# Patient Record
Sex: FEMALE | Race: WHITE | NOT HISPANIC OR LATINO | Employment: UNEMPLOYED | URBAN - METROPOLITAN AREA
[De-identification: names, ages, dates, MRNs, and addresses within clinical notes are randomized per-mention and may not be internally consistent; named-entity substitution may affect disease eponyms.]

---

## 2020-08-10 ENCOUNTER — HOSPITAL ENCOUNTER (EMERGENCY)
Facility: HOSPITAL | Age: 40
Discharge: HOME | End: 2020-08-10
Attending: EMERGENCY MEDICINE
Payer: OTHER GOVERNMENT

## 2020-08-10 VITALS
RESPIRATION RATE: 18 BRPM | TEMPERATURE: 97 F | HEART RATE: 85 BPM | SYSTOLIC BLOOD PRESSURE: 133 MMHG | OXYGEN SATURATION: 99 % | DIASTOLIC BLOOD PRESSURE: 91 MMHG

## 2020-08-10 DIAGNOSIS — M79.671 PAIN OF RIGHT HEEL: Primary | ICD-10-CM

## 2020-08-10 DIAGNOSIS — M79.673 HEEL PAIN: ICD-10-CM

## 2020-08-10 LAB
B-HCG UR QL: NEGATIVE
CTP QC/QA: YES

## 2020-08-10 PROCEDURE — 99283 EMERGENCY DEPT VISIT LOW MDM: CPT | Mod: 25

## 2020-08-10 PROCEDURE — 81025 URINE PREGNANCY TEST: CPT | Performed by: NURSE PRACTITIONER

## 2020-08-10 NOTE — ED PROVIDER NOTES
Encounter Date: 8/10/2020    SCRIBE #1 NOTE: I, Eva Casas, am scribing for, and in the presence of, Juliette Medley NP.       History     Chief Complaint   Patient presents with    Heel Pain     C/o right heel pain x 8 weeks     Time seen by provider: 5:02 PM on 08/10/2020    Piedad Mcmahon is a 39 y.o. female who presents to the ED with an onset of non-radiating right heel pain for 8 weeks. She states that the pain is worse when she applies pressure to her foot and worse in the late afternoon after she has been on her feet all day. The patient endorses taking Advil with little relief. The patient denies any other symptoms at this time. No pertinent PMHx or PSHx. NKDA.      The history is provided by the patient.     Review of patient's allergies indicates:  No Known Allergies  No past medical history on file.  No past surgical history on file.  No family history on file.  Social History     Tobacco Use    Smoking status: Never Smoker   Substance Use Topics    Alcohol use: Yes    Drug use: Not on file     Review of Systems   Constitutional: Negative for activity change, appetite change, chills and fever.   HENT: Negative for congestion, ear pain, rhinorrhea, sinus pressure, sinus pain, sneezing, sore throat and voice change.    Eyes: Negative for pain, discharge and redness.   Respiratory: Negative for cough, shortness of breath, wheezing and stridor.    Cardiovascular: Negative for chest pain, palpitations and leg swelling.   Gastrointestinal: Negative for abdominal distention, abdominal pain, blood in stool, constipation, diarrhea, nausea and vomiting.   Genitourinary: Negative for difficulty urinating, dysuria, flank pain, frequency, hematuria and urgency.   Musculoskeletal: Positive for myalgias (right heel). Negative for arthralgias, gait problem and joint swelling.   Skin: Negative for rash and wound.   Neurological: Negative for dizziness, syncope, facial asymmetry, speech difficulty, weakness,  light-headedness, numbness and headaches.   Hematological: Negative for adenopathy.   Psychiatric/Behavioral: Negative.        Physical Exam     Initial Vitals [08/10/20 1659]   BP Pulse Resp Temp SpO2   (!) 133/91 85 18 97.2 °F (36.2 °C) 99 %      MAP       --         Physical Exam    Nursing note and vitals reviewed.  Constitutional: Vital signs are normal. She appears well-developed and well-nourished. She is active and cooperative. She is easily aroused.  Non-toxic appearance. No distress.   HENT:   Head: Normocephalic and atraumatic.   Right Ear: Tympanic membrane, external ear and ear canal normal. No drainage or tenderness. No mastoid tenderness. Tympanic membrane is not perforated, not erythematous and not bulging. No middle ear effusion.   Left Ear: Tympanic membrane, external ear and ear canal normal. No drainage or tenderness. No mastoid tenderness. Tympanic membrane is not perforated, not erythematous and not bulging.  No middle ear effusion.   Nose: Nose normal. No rhinorrhea.   Mouth/Throat: Uvula is midline, oropharynx is clear and moist and mucous membranes are normal. No uvula swelling. No oropharyngeal exudate, posterior oropharyngeal edema or posterior oropharyngeal erythema.   Eyes: Conjunctivae, EOM and lids are normal. Pupils are equal, round, and reactive to light. Right eye exhibits no chemosis and no discharge. Left eye exhibits no chemosis and no discharge. Right conjunctiva is not injected. Left conjunctiva is not injected.   Neck: Trachea normal and normal range of motion. Neck supple. No stridor present. No tracheal deviation present. No neck rigidity.   Cardiovascular: Normal rate, regular rhythm, normal heart sounds and normal pulses. Exam reveals no distant heart sounds and no friction rub.    No murmur heard.  Pulmonary/Chest: Breath sounds normal. No stridor. She has no wheezes. She has no rhonchi. She has no rales.   Musculoskeletal: Normal range of motion.      Comments: Right  heel tender to palpation. Pain with flexion. No presence of edema or erythema.   Neurological: She is alert, oriented to person, place, and time and easily aroused. She has normal strength. GCS eye subscore is 4. GCS verbal subscore is 5. GCS motor subscore is 6.   5/5 strength and sensation to light touch to BLE.   Skin: Skin is warm, dry and intact. Capillary refill takes less than 2 seconds. No rash noted.   Psychiatric: She has a normal mood and affect. Her speech is normal and behavior is normal. Thought content normal.         ED Course   Procedures  Labs Reviewed   POCT URINE PREGNANCY          Imaging Results          X-Ray Foot Complete Right (In process)                  Medical Decision Making:   History:   Old Medical Records: I decided to obtain old medical records.  Differential Diagnosis:   Fracture  Heel spur  Plantar fascitis   Clinical Tests:   Lab Tests: Ordered and Reviewed  Radiological Study: Ordered and Reviewed       APC / Resident Notes:   39 year old female presents for evaluation of right heel pain x 2 months. Pain worse at end of day and only hurts with pressure. Xray shows a small heel spur which is likely the cause of pain . Pt instructed to take otc pain relievers and f/u with podiatry. I do not feel further emergent evaluation or treatment is needed and pt is stable for discharge. I have discussed pt with Dr Teresa who agrees with poc. Pt voices understanding and is agreeable to the plan.  She is given specific return precautions.          Scribe Attestation:   Scribe #1: I performed the above scribed service and the documentation accurately describes the services I performed. I attest to the accuracy of the note.    I, THAIS Douglass, personally performed the services described in this documentation. All medical record entries made by the scribe were at my direction and in my presence.  I have reviewed the chart and agree that the record reflects my personal performance and  is accurate and complete. THAIS Douglass.  5:46 PM 08/10/2020          ED Course as of Aug 10 1743   Mon Aug 10, 2020   1727 XR R foot:  No fx or dislocation. (my read)    [MR]      ED Course User Index  [MR] Robin Teresa MD                Clinical Impression:       ICD-10-CM ICD-9-CM   1. Pain of right heel  M79.671 729.5   2. Heel pain  M79.673 729.5         Disposition:   Disposition: Discharged  Condition: Stable                        Juliette Medley NP  08/10/20 1746

## 2020-08-10 NOTE — ED NOTES
Pt reports heel pain for about two months. She states she works out everyday and thinks it may be related, but denies known trauma. Pt has a limp when she walks but able to bear weight. Pt is AAOx4, ABC's intact, NADN.

## 2020-08-10 NOTE — DISCHARGE INSTRUCTIONS
You may take over the counter pain relievers as needed. Follow up with primary care doctor and podiatry as needed. Return to ED for new or worsening symptoms.

## 2021-01-19 ENCOUNTER — OFFICE VISIT (OUTPATIENT)
Dept: FAMILY MEDICINE | Facility: CLINIC | Age: 41
End: 2021-01-19
Payer: OTHER GOVERNMENT

## 2021-01-19 VITALS
SYSTOLIC BLOOD PRESSURE: 120 MMHG | DIASTOLIC BLOOD PRESSURE: 72 MMHG | WEIGHT: 173 LBS | HEIGHT: 63 IN | BODY MASS INDEX: 30.65 KG/M2 | HEART RATE: 80 BPM

## 2021-01-19 DIAGNOSIS — Z12.31 OTHER SCREENING MAMMOGRAM: ICD-10-CM

## 2021-01-19 DIAGNOSIS — Z00.00 GENERAL MEDICAL EXAM: Primary | ICD-10-CM

## 2021-01-19 DIAGNOSIS — Z12.4 CERVICAL CANCER SCREENING: ICD-10-CM

## 2021-01-19 DIAGNOSIS — E78.2 MIXED HYPERLIPIDEMIA: ICD-10-CM

## 2021-01-19 DIAGNOSIS — M77.31 HEEL SPUR, RIGHT: ICD-10-CM

## 2021-01-19 PROCEDURE — 99386 PREV VISIT NEW AGE 40-64: CPT | Mod: S$GLB,,, | Performed by: NURSE PRACTITIONER

## 2021-01-19 PROCEDURE — 99386 PR PREVENTIVE VISIT,NEW,40-64: ICD-10-PCS | Mod: S$GLB,,, | Performed by: NURSE PRACTITIONER

## 2021-01-19 RX ORDER — CHOLECALCIFEROL (VITAMIN D3) 25 MCG
1000 TABLET ORAL DAILY
COMMUNITY

## 2021-01-19 RX ORDER — ROSUVASTATIN CALCIUM 10 MG/1
10 TABLET, COATED ORAL DAILY
COMMUNITY
End: 2021-01-22 | Stop reason: SDUPTHER

## 2021-01-22 LAB
ALBUMIN SERPL-MCNC: 4.2 G/DL (ref 3.6–5.1)
ALBUMIN/GLOB SERPL: 1.5 (CALC) (ref 1–2.5)
ALP SERPL-CCNC: 54 U/L (ref 31–125)
ALT SERPL-CCNC: 19 U/L (ref 6–29)
APPEARANCE UR: ABNORMAL
AST SERPL-CCNC: 14 U/L (ref 10–30)
BACTERIA #/AREA URNS HPF: ABNORMAL /HPF
BACTERIA UR CULT: ABNORMAL
BASOPHILS # BLD AUTO: 59 CELLS/UL (ref 0–200)
BASOPHILS NFR BLD AUTO: 0.9 %
BILIRUB SERPL-MCNC: 0.6 MG/DL (ref 0.2–1.2)
BILIRUB UR QL STRIP: NEGATIVE
BUN SERPL-MCNC: 12 MG/DL (ref 7–25)
BUN/CREAT SERPL: NORMAL (CALC) (ref 6–22)
CALCIUM SERPL-MCNC: 9.5 MG/DL (ref 8.6–10.2)
CHLORIDE SERPL-SCNC: 106 MMOL/L (ref 98–110)
CHOLEST SERPL-MCNC: 208 MG/DL
CHOLEST/HDLC SERPL: 3.4 (CALC)
CO2 SERPL-SCNC: 27 MMOL/L (ref 20–32)
COLOR UR: YELLOW
CREAT SERPL-MCNC: 0.7 MG/DL (ref 0.5–1.1)
EOSINOPHIL # BLD AUTO: 112 CELLS/UL (ref 15–500)
EOSINOPHIL NFR BLD AUTO: 1.7 %
ERYTHROCYTE [DISTWIDTH] IN BLOOD BY AUTOMATED COUNT: 13.4 % (ref 11–15)
GFRSERPLBLD MDRD-ARVRAT: 108 ML/MIN/1.73M2
GLOBULIN SER CALC-MCNC: 2.8 G/DL (CALC) (ref 1.9–3.7)
GLUCOSE SERPL-MCNC: 80 MG/DL (ref 65–99)
GLUCOSE UR QL STRIP: NEGATIVE
HCT VFR BLD AUTO: 45.9 % (ref 35–45)
HDLC SERPL-MCNC: 61 MG/DL
HGB BLD-MCNC: 14.1 G/DL (ref 11.7–15.5)
HGB UR QL STRIP: NEGATIVE
HYALINE CASTS #/AREA URNS LPF: ABNORMAL /LPF
KETONES UR QL STRIP: NEGATIVE
LDLC SERPL CALC-MCNC: 125 MG/DL (CALC)
LEUKOCYTE ESTERASE UR QL STRIP: NEGATIVE
LYMPHOCYTES # BLD AUTO: 2673 CELLS/UL (ref 850–3900)
LYMPHOCYTES NFR BLD AUTO: 40.5 %
MCH RBC QN AUTO: 25.6 PG (ref 27–33)
MCHC RBC AUTO-ENTMCNC: 30.7 G/DL (ref 32–36)
MCV RBC AUTO: 83.3 FL (ref 80–100)
MONOCYTES # BLD AUTO: 528 CELLS/UL (ref 200–950)
MONOCYTES NFR BLD AUTO: 8 %
NEUTROPHILS # BLD AUTO: 3227 CELLS/UL (ref 1500–7800)
NEUTROPHILS NFR BLD AUTO: 48.9 %
NITRITE UR QL STRIP: NEGATIVE
NONHDLC SERPL-MCNC: 147 MG/DL (CALC)
PH UR STRIP: 5.5 [PH] (ref 5–8)
PLATELET # BLD AUTO: 299 THOUSAND/UL (ref 140–400)
PMV BLD REES-ECKER: 11.9 FL (ref 7.5–12.5)
POTASSIUM SERPL-SCNC: 5.1 MMOL/L (ref 3.5–5.3)
PROT SERPL-MCNC: 7 G/DL (ref 6.1–8.1)
PROT UR QL STRIP: NEGATIVE
RBC # BLD AUTO: 5.51 MILLION/UL (ref 3.8–5.1)
RBC #/AREA URNS HPF: ABNORMAL /HPF
SODIUM SERPL-SCNC: 142 MMOL/L (ref 135–146)
SP GR UR STRIP: 1.02 (ref 1–1.03)
SQUAMOUS #/AREA URNS HPF: ABNORMAL /HPF
TRIGL SERPL-MCNC: 108 MG/DL
TSH SERPL-ACNC: 3.35 MIU/L
WBC # BLD AUTO: 6.6 THOUSAND/UL (ref 3.8–10.8)
WBC #/AREA URNS HPF: ABNORMAL /HPF

## 2021-01-22 RX ORDER — ROSUVASTATIN CALCIUM 10 MG/1
10 TABLET, COATED ORAL DAILY
Qty: 90 TABLET | Refills: 1 | Status: SHIPPED | OUTPATIENT
Start: 2021-01-22 | End: 2021-09-06 | Stop reason: SDUPTHER

## 2021-01-29 ENCOUNTER — HOSPITAL ENCOUNTER (OUTPATIENT)
Dept: RADIOLOGY | Facility: HOSPITAL | Age: 41
Discharge: HOME | End: 2021-01-29
Attending: NURSE PRACTITIONER
Payer: OTHER GOVERNMENT

## 2021-01-29 VITALS — HEIGHT: 63 IN | BODY MASS INDEX: 30.66 KG/M2 | WEIGHT: 173.06 LBS

## 2021-01-29 DIAGNOSIS — Z12.31 OTHER SCREENING MAMMOGRAM: ICD-10-CM

## 2021-01-29 PROCEDURE — 77067 SCR MAMMO BI INCL CAD: CPT | Mod: TC,PO

## 2021-05-06 ENCOUNTER — PATIENT MESSAGE (OUTPATIENT)
Dept: RESEARCH | Facility: HOSPITAL | Age: 41
End: 2021-05-06

## 2021-09-07 RX ORDER — ROSUVASTATIN CALCIUM 10 MG/1
10 TABLET, COATED ORAL DAILY
Qty: 90 TABLET | Refills: 1 | Status: SHIPPED | OUTPATIENT
Start: 2021-09-07 | End: 2022-02-01 | Stop reason: SDUPTHER

## 2022-01-11 ENCOUNTER — TELEPHONE (OUTPATIENT)
Dept: FAMILY MEDICINE | Facility: CLINIC | Age: 42
End: 2022-01-11
Payer: OTHER GOVERNMENT

## 2022-01-11 DIAGNOSIS — Z79.899 ENCOUNTER FOR LONG-TERM (CURRENT) USE OF OTHER MEDICATIONS: Primary | ICD-10-CM

## 2022-01-11 DIAGNOSIS — E78.2 MIXED HYPERLIPIDEMIA: ICD-10-CM

## 2022-01-28 LAB
ALBUMIN SERPL-MCNC: 4 G/DL (ref 3.6–5.1)
ALBUMIN/GLOB SERPL: 1.7 (CALC) (ref 1–2.5)
ALP SERPL-CCNC: 50 U/L (ref 31–125)
ALT SERPL-CCNC: 20 U/L (ref 6–29)
APPEARANCE UR: ABNORMAL
AST SERPL-CCNC: 15 U/L (ref 10–30)
BACTERIA #/AREA URNS HPF: ABNORMAL /HPF
BACTERIA UR CULT: ABNORMAL
BASOPHILS # BLD AUTO: 44 CELLS/UL (ref 0–200)
BASOPHILS NFR BLD AUTO: 0.6 %
BILIRUB SERPL-MCNC: 0.5 MG/DL (ref 0.2–1.2)
BILIRUB UR QL STRIP: NEGATIVE
BUN SERPL-MCNC: 10 MG/DL (ref 7–25)
BUN/CREAT SERPL: NORMAL (CALC) (ref 6–22)
CALCIUM SERPL-MCNC: 9.1 MG/DL (ref 8.6–10.2)
CHLORIDE SERPL-SCNC: 106 MMOL/L (ref 98–110)
CHOLEST SERPL-MCNC: 183 MG/DL
CHOLEST/HDLC SERPL: 3 (CALC)
CO2 SERPL-SCNC: 24 MMOL/L (ref 20–32)
COLOR UR: YELLOW
CREAT SERPL-MCNC: 0.7 MG/DL (ref 0.5–1.1)
EOSINOPHIL # BLD AUTO: 131 CELLS/UL (ref 15–500)
EOSINOPHIL NFR BLD AUTO: 1.8 %
ERYTHROCYTE [DISTWIDTH] IN BLOOD BY AUTOMATED COUNT: 13.4 % (ref 11–15)
GLOBULIN SER CALC-MCNC: 2.4 G/DL (CALC) (ref 1.9–3.7)
GLUCOSE SERPL-MCNC: 86 MG/DL (ref 65–99)
GLUCOSE UR QL STRIP: NEGATIVE
HCT VFR BLD AUTO: 40.9 % (ref 35–45)
HDLC SERPL-MCNC: 61 MG/DL
HGB BLD-MCNC: 13.3 G/DL (ref 11.7–15.5)
HGB UR QL STRIP: NEGATIVE
HYALINE CASTS #/AREA URNS LPF: ABNORMAL /LPF
KETONES UR QL STRIP: NEGATIVE
LDLC SERPL CALC-MCNC: 103 MG/DL (CALC)
LEUKOCYTE ESTERASE UR QL STRIP: NEGATIVE
LYMPHOCYTES # BLD AUTO: 2365 CELLS/UL (ref 850–3900)
LYMPHOCYTES NFR BLD AUTO: 32.4 %
MCH RBC QN AUTO: 26.2 PG (ref 27–33)
MCHC RBC AUTO-ENTMCNC: 32.5 G/DL (ref 32–36)
MCV RBC AUTO: 80.7 FL (ref 80–100)
MONOCYTES # BLD AUTO: 584 CELLS/UL (ref 200–950)
MONOCYTES NFR BLD AUTO: 8 %
NEUTROPHILS # BLD AUTO: 4176 CELLS/UL (ref 1500–7800)
NEUTROPHILS NFR BLD AUTO: 57.2 %
NITRITE UR QL STRIP: NEGATIVE
NONHDLC SERPL-MCNC: 122 MG/DL (CALC)
PH UR STRIP: 7.5 [PH] (ref 5–8)
PLATELET # BLD AUTO: 284 THOUSAND/UL (ref 140–400)
PMV BLD REES-ECKER: 11.5 FL (ref 7.5–12.5)
POTASSIUM SERPL-SCNC: 4.3 MMOL/L (ref 3.5–5.3)
PROT SERPL-MCNC: 6.4 G/DL (ref 6.1–8.1)
PROT UR QL STRIP: NEGATIVE
RBC # BLD AUTO: 5.07 MILLION/UL (ref 3.8–5.1)
RBC #/AREA URNS HPF: ABNORMAL /HPF
SERVICE CMNT-IMP: ABNORMAL
SODIUM SERPL-SCNC: 138 MMOL/L (ref 135–146)
SP GR UR STRIP: 1.02 (ref 1–1.03)
SQUAMOUS #/AREA URNS HPF: ABNORMAL /HPF
TRIGL SERPL-MCNC: 92 MG/DL
TSH SERPL-ACNC: 2.94 MIU/L
WBC # BLD AUTO: 7.3 THOUSAND/UL (ref 3.8–10.8)
WBC #/AREA URNS HPF: ABNORMAL /HPF

## 2022-02-01 ENCOUNTER — OFFICE VISIT (OUTPATIENT)
Dept: FAMILY MEDICINE | Facility: CLINIC | Age: 42
End: 2022-02-01
Payer: OTHER GOVERNMENT

## 2022-02-01 VITALS
BODY MASS INDEX: 32.07 KG/M2 | HEART RATE: 72 BPM | SYSTOLIC BLOOD PRESSURE: 110 MMHG | WEIGHT: 181 LBS | DIASTOLIC BLOOD PRESSURE: 72 MMHG | HEIGHT: 63 IN

## 2022-02-01 DIAGNOSIS — Z12.31 SCREENING MAMMOGRAM FOR BREAST CANCER: ICD-10-CM

## 2022-02-01 DIAGNOSIS — Z12.4 CERVICAL CANCER SCREENING: ICD-10-CM

## 2022-02-01 DIAGNOSIS — Z00.00 WELLNESS EXAMINATION: Primary | ICD-10-CM

## 2022-02-01 DIAGNOSIS — E78.2 MIXED HYPERLIPIDEMIA: ICD-10-CM

## 2022-02-01 DIAGNOSIS — N92.0 MENORRHAGIA WITH REGULAR CYCLE: ICD-10-CM

## 2022-02-01 PROCEDURE — 99396 PR PREVENTIVE VISIT,EST,40-64: ICD-10-PCS | Mod: S$GLB,,, | Performed by: NURSE PRACTITIONER

## 2022-02-01 PROCEDURE — 99396 PREV VISIT EST AGE 40-64: CPT | Mod: S$GLB,,, | Performed by: NURSE PRACTITIONER

## 2022-02-01 RX ORDER — ROSUVASTATIN CALCIUM 10 MG/1
10 TABLET, COATED ORAL DAILY
Qty: 90 TABLET | Refills: 1 | Status: SHIPPED | OUTPATIENT
Start: 2022-02-01 | End: 2022-09-20 | Stop reason: SDUPTHER

## 2022-02-01 NOTE — PROGRESS NOTES
SUBJECTIVE:    Patient ID: Piedad Mcmahon is a 41 y.o. female.    Chief Complaint: Follow-up and Annual Exam (No bottles// needs ob referral//refuses flu shot// no complaints-MJ)    Pt presents for routine wellness visit. Denies any new complaints. Has been taking Crestor without issue. Exercises 3 days per week. Denies any joint pains. Has gained about 8lbs since last visit. Did not see gynecology last year. C/o increasingly heavy menses. Now experiencing 7 days of heavy menses for the last 7-8 months. Denies any worsening cramping. Cycles are still regular.   Due for mammogram. Had labs completed prior to visit.       Telephone on 01/11/2022   Component Date Value Ref Range Status    WBC 01/27/2022 7.3  3.8 - 10.8 Thousand/uL Final    RBC 01/27/2022 5.07  3.80 - 5.10 Million/uL Final    Hemoglobin 01/27/2022 13.3  11.7 - 15.5 g/dL Final    Hematocrit 01/27/2022 40.9  35.0 - 45.0 % Final    MCV 01/27/2022 80.7  80.0 - 100.0 fL Final    MCH 01/27/2022 26.2* 27.0 - 33.0 pg Final    MCHC 01/27/2022 32.5  32.0 - 36.0 g/dL Final    RDW 01/27/2022 13.4  11.0 - 15.0 % Final    Platelets 01/27/2022 284  140 - 400 Thousand/uL Final    MPV 01/27/2022 11.5  7.5 - 12.5 fL Final    Neutrophils, Abs 01/27/2022 4,176  1,500 - 7,800 cells/uL Final    Lymph # 01/27/2022 2,365  850 - 3,900 cells/uL Final    Mono # 01/27/2022 584  200 - 950 cells/uL Final    Eos # 01/27/2022 131  15 - 500 cells/uL Final    Baso # 01/27/2022 44  0 - 200 cells/uL Final    Neutrophils Relative 01/27/2022 57.2  % Final    Lymph % 01/27/2022 32.4  % Final    Mono % 01/27/2022 8.0  % Final    Eosinophil % 01/27/2022 1.8  % Final    Basophil % 01/27/2022 0.6  % Final    Glucose 01/27/2022 86  65 - 99 mg/dL Final    BUN 01/27/2022 10  7 - 25 mg/dL Final    Creatinine 01/27/2022 0.70  0.50 - 1.10 mg/dL Final    eGFR if non African American 01/27/2022 107  > OR = 60 mL/min/1.73m2 Final    eGFR if  01/27/2022 124  >  OR = 60 mL/min/1.73m2 Final    BUN/Creatinine Ratio 01/27/2022 NOT APPLICABLE  6 - 22 (calc) Final    Sodium 01/27/2022 138  135 - 146 mmol/L Final    Potassium 01/27/2022 4.3  3.5 - 5.3 mmol/L Final    Chloride 01/27/2022 106  98 - 110 mmol/L Final    CO2 01/27/2022 24  20 - 32 mmol/L Final    Calcium 01/27/2022 9.1  8.6 - 10.2 mg/dL Final    Total Protein 01/27/2022 6.4  6.1 - 8.1 g/dL Final    Albumin 01/27/2022 4.0  3.6 - 5.1 g/dL Final    Globulin, Total 01/27/2022 2.4  1.9 - 3.7 g/dL (calc) Final    Albumin/Globulin Ratio 01/27/2022 1.7  1.0 - 2.5 (calc) Final    Total Bilirubin 01/27/2022 0.5  0.2 - 1.2 mg/dL Final    Alkaline Phosphatase 01/27/2022 50  31 - 125 U/L Final    AST 01/27/2022 15  10 - 30 U/L Final    ALT 01/27/2022 20  6 - 29 U/L Final    Cholesterol 01/27/2022 183  <200 mg/dL Final    HDL 01/27/2022 61  > OR = 50 mg/dL Final    Triglycerides 01/27/2022 92  <150 mg/dL Final    LDL Cholesterol 01/27/2022 103* mg/dL (calc) Final    HDL/Cholesterol Ratio 01/27/2022 3.0  <5.0 (calc) Final    Non HDL Chol. (LDL+VLDL) 01/27/2022 122  <130 mg/dL (calc) Final    TSH w/reflex to FT4 01/27/2022 2.94  mIU/L Final    Color, UA 01/27/2022 YELLOW  YELLOW Final    Appearance, UA 01/27/2022 CLOUDY* CLEAR Final    Specific [place], UA 01/27/2022 1.019  1.001 - 1.035 Final    pH, UA 01/27/2022 7.5  5.0 - 8.0 Final    Glucose, UA 01/27/2022 NEGATIVE  NEGATIVE Final    Bilirubin, UA 01/27/2022 NEGATIVE  NEGATIVE Final    Ketones, UA 01/27/2022 NEGATIVE  NEGATIVE Final    Occult Blood UA 01/27/2022 NEGATIVE  NEGATIVE Final    Protein, UA 01/27/2022 NEGATIVE  NEGATIVE Final    Nitrite, UA 01/27/2022 NEGATIVE  NEGATIVE Final    Leukocytes, UA 01/27/2022 NEGATIVE  NEGATIVE Final    WBC Casts, UA 01/27/2022 0-5  < OR = 5 /HPF Final    RBC Casts, UA 01/27/2022 0-2  < OR = 2 /HPF Final    Squam Epithel, UA 01/27/2022 6-10* < OR = 5 /HPF Final    Bacteria, UA 01/27/2022 FEW* NONE SEEN  "/HPF Final    Hyaline Casts, UA 2022 0-1* NONE SEEN /LPF Final    Comments: 2022 FEW MUCOUS THREADS   Final    Reflexive Urine Culture 2022    Final       Past Medical History:   Diagnosis Date    Hyperlipidemia      Past Surgical History:   Procedure Laterality Date     SECTION      x3     Family History   Problem Relation Age of Onset    Heart disease Father     Diabetes Maternal Grandfather     Heart disease Paternal Grandmother     Diabetes Paternal Grandfather        Marital Status:   Alcohol History:  reports current alcohol use.  Tobacco History:  reports that she has never smoked. She has never used smokeless tobacco.  Drug History:  has no history on file for drug use.    Review of patient's allergies indicates:  No Known Allergies    Current Outpatient Medications:     azithromycin (Z-SOFIE) 250 MG tablet, Take 1 tablet (250 mg total) by mouth once daily. Take first 2 tablets together, then 1 every day until finished., Disp: 6 tablet, Rfl: 0    rosuvastatin (CRESTOR) 10 MG tablet, Take 1 tablet (10 mg total) by mouth once daily., Disp: 90 tablet, Rfl: 1    vitamin D (VITAMIN D3) 1000 units Tab, Take 1,000 Units by mouth once daily., Disp: , Rfl:     Review of Systems   Constitutional: Negative for activity change, fatigue and unexpected weight change.   HENT: Negative.    Respiratory: Negative for cough, chest tightness and shortness of breath.    Cardiovascular: Negative for chest pain and palpitations.   Gastrointestinal: Negative for abdominal distention and constipation.   Genitourinary: Positive for menstrual problem (heavy periods).   Musculoskeletal: Negative for arthralgias and back pain.   Neurological: Negative for dizziness and headaches.   Psychiatric/Behavioral: Negative.           Objective:      Vitals:    22 0753   BP: 110/72   Pulse: 72   Weight: 82.1 kg (181 lb)   Height: 5' 3" (1.6 m)     Body mass index is 32.06 kg/m².  Physical " Exam  Constitutional:       Appearance: Normal appearance.   HENT:      Head: Normocephalic and atraumatic.      Right Ear: Tympanic membrane normal.      Left Ear: Tympanic membrane normal.   Cardiovascular:      Rate and Rhythm: Normal rate and regular rhythm.      Heart sounds: Normal heart sounds.   Pulmonary:      Effort: Pulmonary effort is normal. No respiratory distress.      Breath sounds: Normal breath sounds.   Abdominal:      General: There is no distension.      Tenderness: There is no abdominal tenderness.   Musculoskeletal:         General: Normal range of motion.      Cervical back: Normal range of motion. No tenderness.   Skin:     General: Skin is warm.   Neurological:      Mental Status: She is alert and oriented to person, place, and time.   Psychiatric:         Mood and Affect: Mood normal.           Assessment:       1. Wellness examination    2. Cervical cancer screening    3. Mixed hyperlipidemia    4. Screening mammogram for breast cancer    5. Menorrhagia with regular cycle    6. BMI 32.0-32.9,adult         Plan:       Wellness examination    Cervical cancer screening  -     Ambulatory referral/consult to Obstetrics / Gynecology; Future; Expected date: 02/01/2022    Mixed hyperlipidemia  -     rosuvastatin (CRESTOR) 10 MG tablet; Take 1 tablet (10 mg total) by mouth once daily.  Dispense: 90 tablet; Refill: 1    Screening mammogram for breast cancer  -     Mammo Digital Screening Bilat w/ [facility]; Future; Expected date: 02/01/2022    Menorrhagia with regular cycle  -     Ambulatory referral/consult to Obstetrics / Gynecology; Future; Expected date: 02/01/2022    BMI 32.0-32.9,adult    Pt doing well at this time. Encouraged dietary modification. New referral placed to Gynecology.    Follow up in about 1 year (around 2/1/2023) for Annual Physical.

## 2022-02-17 ENCOUNTER — HOSPITAL ENCOUNTER (OUTPATIENT)
Dept: RADIOLOGY | Facility: HOSPITAL | Age: 42
Discharge: HOME | End: 2022-02-17
Attending: NURSE PRACTITIONER
Payer: OTHER GOVERNMENT

## 2022-02-17 VITALS — BODY MASS INDEX: 32.07 KG/M2 | HEIGHT: 63 IN | WEIGHT: 181 LBS

## 2022-02-17 DIAGNOSIS — Z12.31 SCREENING MAMMOGRAM FOR BREAST CANCER: ICD-10-CM

## 2022-02-17 PROCEDURE — 77067 SCR MAMMO BI INCL CAD: CPT | Mod: TC,PO

## 2022-03-21 ENCOUNTER — PATIENT MESSAGE (OUTPATIENT)
Dept: FAMILY MEDICINE | Facility: CLINIC | Age: 42
End: 2022-03-21

## 2022-03-21 ENCOUNTER — PATIENT MESSAGE (OUTPATIENT)
Dept: FAMILY MEDICINE | Facility: CLINIC | Age: 42
End: 2022-03-21
Payer: OTHER GOVERNMENT

## 2022-03-21 ENCOUNTER — OFFICE VISIT (OUTPATIENT)
Dept: FAMILY MEDICINE | Facility: CLINIC | Age: 42
End: 2022-03-21
Payer: OTHER GOVERNMENT

## 2022-03-21 VITALS
WEIGHT: 177 LBS | HEIGHT: 63 IN | HEART RATE: 70 BPM | BODY MASS INDEX: 31.36 KG/M2 | SYSTOLIC BLOOD PRESSURE: 110 MMHG | OXYGEN SATURATION: 96 % | DIASTOLIC BLOOD PRESSURE: 72 MMHG

## 2022-03-21 DIAGNOSIS — M25.561 CHRONIC PAIN OF RIGHT KNEE: Primary | ICD-10-CM

## 2022-03-21 DIAGNOSIS — G89.29 CHRONIC PAIN OF RIGHT KNEE: Primary | ICD-10-CM

## 2022-03-21 PROCEDURE — 99213 PR OFFICE/OUTPT VISIT, EST, LEVL III, 20-29 MIN: ICD-10-PCS | Mod: S$GLB,,, | Performed by: NURSE PRACTITIONER

## 2022-03-21 PROCEDURE — 99213 OFFICE O/P EST LOW 20 MIN: CPT | Mod: S$GLB,,, | Performed by: NURSE PRACTITIONER

## 2022-03-21 NOTE — PROGRESS NOTES
SUBJECTIVE:    Patient ID: Piedad Mcmahon is a 41 y.o. female.    Chief Complaint: Knee Pain (Right knee pain when kneeling only for 7 months//no med bottles//tc)    Pt presents with c/o right knee pain along the patella. Only occurs when she kneels (like at [employer] or exercising). Has been ongoing for a few months now. No pain with palpation. No other activities cause the pain. Can run, jump, and walk without issue. Denies any recent injuries to the knee. Has not really tried anything to alleviate the pain. Only occurs when she is kneeling and resolves when she removes weight/pressure from the knee (with standing or sitting).         Telephone on 01/11/2022   Component Date Value Ref Range Status    WBC 01/27/2022 7.3  3.8 - 10.8 Thousand/uL Final    RBC 01/27/2022 5.07  3.80 - 5.10 Million/uL Final    Hemoglobin 01/27/2022 13.3  11.7 - 15.5 g/dL Final    Hematocrit 01/27/2022 40.9  35.0 - 45.0 % Final    MCV 01/27/2022 80.7  80.0 - 100.0 fL Final    MCH 01/27/2022 26.2 (A) 27.0 - 33.0 pg Final    MCHC 01/27/2022 32.5  32.0 - 36.0 g/dL Final    RDW 01/27/2022 13.4  11.0 - 15.0 % Final    Platelets 01/27/2022 284  140 - 400 Thousand/uL Final    MPV 01/27/2022 11.5  7.5 - 12.5 fL Final    Neutrophils, Abs 01/27/2022 4,176  1,500 - 7,800 cells/uL Final    Lymph # 01/27/2022 2,365  850 - 3,900 cells/uL Final    Mono # 01/27/2022 584  200 - 950 cells/uL Final    Eos # 01/27/2022 131  15 - 500 cells/uL Final    Baso # 01/27/2022 44  0 - 200 cells/uL Final    Neutrophils Relative 01/27/2022 57.2  % Final    Lymph % 01/27/2022 32.4  % Final    Mono % 01/27/2022 8.0  % Final    Eosinophil % 01/27/2022 1.8  % Final    Basophil % 01/27/2022 0.6  % Final    Glucose 01/27/2022 86  65 - 99 mg/dL Final    BUN 01/27/2022 10  7 - 25 mg/dL Final    Creatinine 01/27/2022 0.70  0.50 - 1.10 mg/dL Final    eGFR if non African American 01/27/2022 107  > OR = 60 mL/min/1.73m2 Final    eGFR if   01/27/2022 124  > OR = 60 mL/min/1.73m2 Final    BUN/Creatinine Ratio 01/27/2022 NOT APPLICABLE  6 - 22 (calc) Final    Sodium 01/27/2022 138  135 - 146 mmol/L Final    Potassium 01/27/2022 4.3  3.5 - 5.3 mmol/L Final    Chloride 01/27/2022 106  98 - 110 mmol/L Final    CO2 01/27/2022 24  20 - 32 mmol/L Final    Calcium 01/27/2022 9.1  8.6 - 10.2 mg/dL Final    Total Protein 01/27/2022 6.4  6.1 - 8.1 g/dL Final    Albumin 01/27/2022 4.0  3.6 - 5.1 g/dL Final    Globulin, Total 01/27/2022 2.4  1.9 - 3.7 g/dL (calc) Final    Albumin/Globulin Ratio 01/27/2022 1.7  1.0 - 2.5 (calc) Final    Total Bilirubin 01/27/2022 0.5  0.2 - 1.2 mg/dL Final    Alkaline Phosphatase 01/27/2022 50  31 - 125 U/L Final    AST 01/27/2022 15  10 - 30 U/L Final    ALT 01/27/2022 20  6 - 29 U/L Final    Cholesterol 01/27/2022 183  <200 mg/dL Final    HDL 01/27/2022 61  > OR = 50 mg/dL Final    Triglycerides 01/27/2022 92  <150 mg/dL Final    LDL Cholesterol 01/27/2022 103 (A) mg/dL (calc) Final    HDL/Cholesterol Ratio 01/27/2022 3.0  <5.0 (calc) Final    Non HDL Chol. (LDL+VLDL) 01/27/2022 122  <130 mg/dL (calc) Final    TSH w/reflex to FT4 01/27/2022 2.94  mIU/L Final    Color, UA 01/27/2022 YELLOW  YELLOW Final    Appearance, UA 01/27/2022 CLOUDY (A) CLEAR Final    Specific [place], UA 01/27/2022 1.019  1.001 - 1.035 Final    pH, UA 01/27/2022 7.5  5.0 - 8.0 Final    Glucose, UA 01/27/2022 NEGATIVE  NEGATIVE Final    Bilirubin, UA 01/27/2022 NEGATIVE  NEGATIVE Final    Ketones, UA 01/27/2022 NEGATIVE  NEGATIVE Final    Occult Blood UA 01/27/2022 NEGATIVE  NEGATIVE Final    Protein, UA 01/27/2022 NEGATIVE  NEGATIVE Final    Nitrite, UA 01/27/2022 NEGATIVE  NEGATIVE Final    Leukocytes, UA 01/27/2022 NEGATIVE  NEGATIVE Final    WBC Casts, UA 01/27/2022 0-5  < OR = 5 /HPF Final    RBC Casts, UA 01/27/2022 0-2  < OR = 2 /HPF Final    Squam Epithel, UA 01/27/2022 6-10 (A) < OR = 5 /HPF Final    Bacteria, UA  "2022 FEW (A) NONE SEEN /HPF Final    Hyaline Casts, UA 2022 0-1 (A) NONE SEEN /LPF Final    Comments: 2022 FEW MUCOUS THREADS   Final    Reflexive Urine Culture 2022    Final       Past Medical History:   Diagnosis Date    Hyperlipidemia      Past Surgical History:   Procedure Laterality Date     SECTION      x3     Family History   Problem Relation Age of Onset    Heart disease Father     Diabetes Maternal Grandfather     Heart disease Paternal Grandmother     Diabetes Paternal Grandfather        Marital Status:   Alcohol History:  reports current alcohol use.  Tobacco History:  reports that she has never smoked. She has never used smokeless tobacco.  Drug History:  has no history on file for drug use.    Review of patient's allergies indicates:  No Known Allergies    Current Outpatient Medications:     azithromycin (Z-SOFIE) 250 MG tablet, Take 1 tablet (250 mg total) by mouth once daily. Take first 2 tablets together, then 1 every day until finished. (Patient not taking: Reported on 3/21/2022), Disp: 6 tablet, Rfl: 0    rosuvastatin (CRESTOR) 10 MG tablet, Take 1 tablet (10 mg total) by mouth once daily., Disp: 90 tablet, Rfl: 1    vitamin D (VITAMIN D3) 1000 units Tab, Take 1,000 Units by mouth once daily., Disp: , Rfl:     Review of Systems   Constitutional: Negative for activity change and fatigue.   HENT: Negative.    Respiratory: Negative.    Cardiovascular: Negative.    Musculoskeletal: Positive for arthralgias (right patellar knee pain with kneeling).   Neurological: Negative for dizziness and headaches.          Objective:      Vitals:    22 1422   BP: 110/72   Pulse: 70   SpO2: 96%   Weight: 80.3 kg (177 lb)   Height: 5' 3" (1.6 m)     Body mass index is 31.35 kg/m².  Physical Exam  Constitutional:       Appearance: Normal appearance.   HENT:      Head: Normocephalic and atraumatic.   Cardiovascular:      Rate and Rhythm: Normal rate.   Pulmonary:    "   Effort: Pulmonary effort is normal. No respiratory distress.   Musculoskeletal:      Right knee: No swelling or bony tenderness. Normal range of motion. Normal patellar mobility.   Skin:     General: Skin is warm.   Neurological:      Mental Status: She is alert and oriented to person, place, and time.   Psychiatric:         Mood and Affect: Mood normal.           Assessment:       1. Chronic pain of right knee         Plan:       Chronic pain of right knee  -     Ambulatory referral/consult to Orthopedics; Future; Expected date: 03/21/2022    Unsure of source of the pain. Will refer to Ortho for workup per pt's request.    Follow up if symptoms worsen or fail to improve.

## 2022-03-22 ENCOUNTER — TELEPHONE (OUTPATIENT)
Dept: FAMILY MEDICINE | Facility: CLINIC | Age: 42
End: 2022-03-22
Payer: OTHER GOVERNMENT

## 2022-03-23 ENCOUNTER — TELEPHONE (OUTPATIENT)
Dept: FAMILY MEDICINE | Facility: CLINIC | Age: 42
End: 2022-03-23
Payer: OTHER GOVERNMENT

## 2022-04-04 DIAGNOSIS — M25.561 RIGHT KNEE PAIN, UNSPECIFIED CHRONICITY: Primary | ICD-10-CM

## 2022-04-11 ENCOUNTER — HOSPITAL ENCOUNTER (OUTPATIENT)
Dept: RADIOLOGY | Facility: HOSPITAL | Age: 42
Discharge: HOME | End: 2022-04-11
Attending: ORTHOPAEDIC SURGERY
Payer: OTHER GOVERNMENT

## 2022-04-11 ENCOUNTER — OFFICE VISIT (OUTPATIENT)
Dept: ORTHOPEDICS | Facility: CLINIC | Age: 42
End: 2022-04-11
Payer: OTHER GOVERNMENT

## 2022-04-11 VITALS — HEIGHT: 63 IN | WEIGHT: 177 LBS | BODY MASS INDEX: 31.36 KG/M2 | RESPIRATION RATE: 18 BRPM

## 2022-04-11 DIAGNOSIS — M25.561 CHRONIC PAIN OF RIGHT KNEE: ICD-10-CM

## 2022-04-11 DIAGNOSIS — M22.41 CHONDROMALACIA, PATELLA, RIGHT: Primary | ICD-10-CM

## 2022-04-11 DIAGNOSIS — M25.561 RIGHT KNEE PAIN, UNSPECIFIED CHRONICITY: ICD-10-CM

## 2022-04-11 DIAGNOSIS — G89.29 CHRONIC PAIN OF RIGHT KNEE: ICD-10-CM

## 2022-04-11 PROCEDURE — 99243 OFF/OP CNSLTJ NEW/EST LOW 30: CPT | Mod: S$PBB,,, | Performed by: ORTHOPAEDIC SURGERY

## 2022-04-11 PROCEDURE — 73562 X-RAY EXAM OF KNEE 3: CPT | Mod: 26,LT,, | Performed by: RADIOLOGY

## 2022-04-11 PROCEDURE — 73562 X-RAY EXAM OF KNEE 3: CPT | Mod: TC,PN,LT

## 2022-04-11 PROCEDURE — 99999 PR PBB SHADOW E&M-EST. PATIENT-LVL III: CPT | Mod: PBBFAC,,, | Performed by: ORTHOPAEDIC SURGERY

## 2022-04-11 PROCEDURE — 73564 XR KNEE ORTHO RIGHT WITH FLEXION: ICD-10-PCS | Mod: 26,RT,, | Performed by: RADIOLOGY

## 2022-04-11 PROCEDURE — 99999 PR PBB SHADOW E&M-EST. PATIENT-LVL III: ICD-10-PCS | Mod: PBBFAC,,, | Performed by: ORTHOPAEDIC SURGERY

## 2022-04-11 PROCEDURE — 73564 X-RAY EXAM KNEE 4 OR MORE: CPT | Mod: 26,RT,, | Performed by: RADIOLOGY

## 2022-04-11 PROCEDURE — 99243 PR OFFICE CONSULTATION,LEVEL III: ICD-10-PCS | Mod: S$PBB,,, | Performed by: ORTHOPAEDIC SURGERY

## 2022-04-11 PROCEDURE — 73562 XR KNEE ORTHO RIGHT WITH FLEXION: ICD-10-PCS | Mod: 26,LT,, | Performed by: RADIOLOGY

## 2022-04-11 PROCEDURE — 99213 OFFICE O/P EST LOW 20 MIN: CPT | Mod: PBBFAC,PN | Performed by: ORTHOPAEDIC SURGERY

## 2022-04-11 NOTE — PROGRESS NOTES
Past Medical History:   Diagnosis Date    Hyperlipidemia        Past Surgical History:   Procedure Laterality Date     SECTION      x3       Current Outpatient Medications   Medication Sig    rosuvastatin (CRESTOR) 10 MG tablet Take 1 tablet (10 mg total) by mouth once daily.    vitamin D (VITAMIN D3) 1000 units Tab Take 1,000 Units by mouth once daily.    azithromycin (Z-SOFIE) 250 MG tablet Take 1 tablet (250 mg total) by mouth once daily. Take first 2 tablets together, then 1 every day until finished. (Patient not taking: Reported on 3/21/2022)     No current facility-administered medications for this visit.       Review of patient's allergies indicates:  No Known Allergies    Family History   Problem Relation Age of Onset    Heart disease Father     Diabetes Maternal Grandfather     Heart disease Paternal Grandmother     Diabetes Paternal Grandfather        Social History     Socioeconomic History    Marital status:    Tobacco Use    Smoking status: Never Smoker    Smokeless tobacco: Never Used   Substance and Sexual Activity    Alcohol use: Yes    Sexual activity: Yes     Birth control/protection: Coitus interruptus       Chief Complaint:   Chief Complaint   Patient presents with    Right Knee - Pain       History of present illness:  This is a 41-year-old female seen in consultation for POLINA Posadas  for right knee pain.  Started back in September or  so.  No specific injury.  Noticed it after doing a lot of Grout on her sandy.  Pain is right over the anterolateral knee.  No swelling.  No mechanical symptoms.  Got worse about 2 weeks ago.  Cannot kneel.  Pain is size 7/10.      Answers for HPI/ROS submitted by the patient on 2022  unexpected weight change: No  appetite change : No  sleep disturbance: No  IMMUNOCOMPROMISED: No  nervous/ anxious: No  dysphoric mood: No  rash: No  visual disturbance: No  eye redness: No  eye pain: No  ear pain: No  tinnitus: No  hearing loss:  No  sinus pressure : No  nosebleeds: No  enviro allergies: No  food allergies: No  cough: No  shortness of breath: No  sweating: No  dysuria: No  frequency: No  difficulty urinating: No  hematuria: No  painful intercourse: No  chest pain: No  palpitations: No  nausea: No  vomiting: No  diarrhea: No  blood in stool: No  constipation: No  headaches: No  dizziness: No  numbness: No  seizures: No  joint swelling: No  myalgia: No  weakness: No  back pain: No  Pain Chronicity: recurrent  History of trauma: No  Onset: more than 1 month ago  Frequency: intermittently  Progression since onset: unchanged  injury location: exercising  pain- numeric: 4/10  pain location: right knee  pain quality: sharp  Radiating Pain: No  Aggravating factors: contact, touching  fever: No  inability to bear weight: No  itching: No  joint locking: No  limited range of motion: No  stiffness: No  tingling: No  Treatments tried: nothing  physical therapy: not tried  Improvement on treatment: no relief      Physical Examination:    Vital Signs:    Vitals:    04/11/22 1507   Resp: 18       Body mass index is 31.35 kg/m².    This a well-developed, well nourished patient in no acute distress.  They are alert and oriented and cooperative to examination.  Pt. walks without an antalgic gait.      Examination of the right knee shows no rashes or erythema. There are no masses ecchymosis or effusion. Patient has full range of motion from 0-130°. Patient is nontender to palpation over lateral joint line and nontender to palpation over the medial joint line. Patient has a - Lachman exam, - anterior drawer exam, and - posterior drawer exam. - Yolande's exam. Knee is stable to varus and valgus stress. 5 out of 5 motor strength. Palpable distal pulses. Intact light touch sensation. Negative Patellofemoral crepitus      X-rays:  X-rays of the right knee are ordered and review which show some lateral patellar tilt.     Assessment::  Right patellofemoral  pain    Plan:  I reviewed the findings with her today.  I think it is likely she has some chondromalacia patella.  I gave her some information about this.  Gave her a patellofemoral exercise guide.  Also encouraged her to try a knee sleeve.  Follow-up if not improving.    This note was created using Interview voice recognition software that occasionally misinterpreted phrases or words.    Consult note is delivered via Epic messaging service.

## 2022-09-20 DIAGNOSIS — E78.2 MIXED HYPERLIPIDEMIA: ICD-10-CM

## 2022-09-20 RX ORDER — ROSUVASTATIN CALCIUM 10 MG/1
10 TABLET, COATED ORAL DAILY
Qty: 90 TABLET | Refills: 1 | Status: SHIPPED | OUTPATIENT
Start: 2022-09-20 | End: 2023-05-09 | Stop reason: SDUPTHER

## 2022-09-20 NOTE — TELEPHONE ENCOUNTER
----- Message from Nilam Cerrato sent at 9/20/2022  8:29 AM CDT -----  Patient called and stated that she need a refill of her rosuvastatin called into [facility] on  if any questions please give her a call at [phone]

## 2023-02-23 DIAGNOSIS — Z12.31 ENCOUNTER FOR SCREENING MAMMOGRAM FOR MALIGNANT NEOPLASM OF BREAST: Primary | ICD-10-CM

## 2023-03-06 ENCOUNTER — HOSPITAL ENCOUNTER (OUTPATIENT)
Dept: RADIOLOGY | Facility: HOSPITAL | Age: 43
Discharge: HOME | End: 2023-03-06
Attending: NURSE PRACTITIONER
Payer: OTHER GOVERNMENT

## 2023-03-06 VITALS — WEIGHT: 177 LBS | HEIGHT: 63 IN | BODY MASS INDEX: 31.36 KG/M2

## 2023-03-06 DIAGNOSIS — Z12.31 ENCOUNTER FOR SCREENING MAMMOGRAM FOR MALIGNANT NEOPLASM OF BREAST: ICD-10-CM

## 2023-03-06 PROCEDURE — 77067 SCR MAMMO BI INCL CAD: CPT | Mod: TC,PO

## 2023-03-22 ENCOUNTER — TELEPHONE (OUTPATIENT)
Dept: FAMILY MEDICINE | Facility: CLINIC | Age: 43
End: 2023-03-22

## 2023-03-22 DIAGNOSIS — Z79.899 ENCOUNTER FOR LONG-TERM (CURRENT) USE OF OTHER MEDICATIONS: ICD-10-CM

## 2023-03-22 DIAGNOSIS — N92.0 MENORRHAGIA WITH REGULAR CYCLE: ICD-10-CM

## 2023-03-22 DIAGNOSIS — E78.2 MIXED HYPERLIPIDEMIA: ICD-10-CM

## 2023-03-22 DIAGNOSIS — Z00.00 WELLNESS EXAMINATION: Primary | ICD-10-CM

## 2023-03-22 NOTE — TELEPHONE ENCOUNTER
Spoke to pt , Hayder, to remind him lab is due. He also inquired about Ms Piedad hernandez. Added her yearly orders. Said they are coming next week.

## 2023-05-04 LAB
ALBUMIN SERPL-MCNC: 4.3 G/DL (ref 3.6–5.1)
ALBUMIN/GLOB SERPL: 1.6 (CALC) (ref 1–2.5)
ALP SERPL-CCNC: 54 U/L (ref 31–125)
ALT SERPL-CCNC: 15 U/L (ref 6–29)
APPEARANCE UR: CLEAR
AST SERPL-CCNC: 13 U/L (ref 10–30)
BACTERIA #/AREA URNS HPF: ABNORMAL /HPF
BACTERIA UR CULT: ABNORMAL
BASOPHILS # BLD AUTO: 52 CELLS/UL (ref 0–200)
BASOPHILS NFR BLD AUTO: 0.8 %
BILIRUB SERPL-MCNC: 0.8 MG/DL (ref 0.2–1.2)
BILIRUB UR QL STRIP: NEGATIVE
BUN SERPL-MCNC: 10 MG/DL (ref 7–25)
BUN/CREAT SERPL: NORMAL (CALC) (ref 6–22)
CALCIUM SERPL-MCNC: 9.4 MG/DL (ref 8.6–10.2)
CHLORIDE SERPL-SCNC: 105 MMOL/L (ref 98–110)
CHOLEST SERPL-MCNC: 215 MG/DL
CHOLEST/HDLC SERPL: 3 (CALC)
CO2 SERPL-SCNC: 28 MMOL/L (ref 20–32)
COLOR UR: ABNORMAL
CREAT SERPL-MCNC: 0.76 MG/DL (ref 0.5–0.99)
EGFR: 100 ML/MIN/1.73M2
EOSINOPHIL # BLD AUTO: 72 CELLS/UL (ref 15–500)
EOSINOPHIL NFR BLD AUTO: 1.1 %
ERYTHROCYTE [DISTWIDTH] IN BLOOD BY AUTOMATED COUNT: 13.3 % (ref 11–15)
GLOBULIN SER CALC-MCNC: 2.7 G/DL (CALC) (ref 1.9–3.7)
GLUCOSE SERPL-MCNC: 91 MG/DL (ref 65–99)
GLUCOSE UR QL STRIP: NEGATIVE
HCT VFR BLD AUTO: 43.5 % (ref 35–45)
HDLC SERPL-MCNC: 71 MG/DL
HGB BLD-MCNC: 14.4 G/DL (ref 11.7–15.5)
HGB UR QL STRIP: NEGATIVE
HYALINE CASTS #/AREA URNS LPF: ABNORMAL /LPF
KETONES UR QL STRIP: NEGATIVE
LDLC SERPL CALC-MCNC: 124 MG/DL (CALC)
LEUKOCYTE ESTERASE UR QL STRIP: NEGATIVE
LYMPHOCYTES # BLD AUTO: 1983 CELLS/UL (ref 850–3900)
LYMPHOCYTES NFR BLD AUTO: 30.5 %
MCH RBC QN AUTO: 27.8 PG (ref 27–33)
MCHC RBC AUTO-ENTMCNC: 33.1 G/DL (ref 32–36)
MCV RBC AUTO: 84 FL (ref 80–100)
MONOCYTES # BLD AUTO: 553 CELLS/UL (ref 200–950)
MONOCYTES NFR BLD AUTO: 8.5 %
NEUTROPHILS # BLD AUTO: 3842 CELLS/UL (ref 1500–7800)
NEUTROPHILS NFR BLD AUTO: 59.1 %
NITRITE UR QL STRIP: NEGATIVE
NONHDLC SERPL-MCNC: 144 MG/DL (CALC)
PH UR STRIP: 6.5 [PH] (ref 5–8)
PLATELET # BLD AUTO: 292 THOUSAND/UL (ref 140–400)
PMV BLD REES-ECKER: 11.9 FL (ref 7.5–12.5)
POTASSIUM SERPL-SCNC: 4.3 MMOL/L (ref 3.5–5.3)
PROT SERPL-MCNC: 7 G/DL (ref 6.1–8.1)
PROT UR QL STRIP: NEGATIVE
RBC # BLD AUTO: 5.18 MILLION/UL (ref 3.8–5.1)
RBC #/AREA URNS HPF: ABNORMAL /HPF
SERVICE CMNT-IMP: ABNORMAL
SODIUM SERPL-SCNC: 143 MMOL/L (ref 135–146)
SP GR UR STRIP: 1.02 (ref 1–1.03)
SQUAMOUS #/AREA URNS HPF: ABNORMAL /HPF
TRIGL SERPL-MCNC: 102 MG/DL
TSH SERPL-ACNC: 2.97 MIU/L
WBC # BLD AUTO: 6.5 THOUSAND/UL (ref 3.8–10.8)
WBC #/AREA URNS HPF: ABNORMAL /HPF

## 2023-05-09 ENCOUNTER — OFFICE VISIT (OUTPATIENT)
Dept: FAMILY MEDICINE | Facility: CLINIC | Age: 43
End: 2023-05-09
Payer: OTHER GOVERNMENT

## 2023-05-09 ENCOUNTER — TELEPHONE (OUTPATIENT)
Dept: FAMILY MEDICINE | Facility: CLINIC | Age: 43
End: 2023-05-09

## 2023-05-09 VITALS
BODY MASS INDEX: 28.21 KG/M2 | DIASTOLIC BLOOD PRESSURE: 84 MMHG | HEIGHT: 63 IN | WEIGHT: 159.19 LBS | HEART RATE: 88 BPM | SYSTOLIC BLOOD PRESSURE: 134 MMHG

## 2023-05-09 DIAGNOSIS — E78.2 MIXED HYPERLIPIDEMIA: ICD-10-CM

## 2023-05-09 DIAGNOSIS — F41.1 GAD (GENERALIZED ANXIETY DISORDER): ICD-10-CM

## 2023-05-09 DIAGNOSIS — Z00.00 ANNUAL PHYSICAL EXAM: Primary | ICD-10-CM

## 2023-05-09 PROCEDURE — 99396 PREV VISIT EST AGE 40-64: CPT | Mod: S$GLB,,, | Performed by: NURSE PRACTITIONER

## 2023-05-09 PROCEDURE — 99396 PR PREVENTIVE VISIT,EST,40-64: ICD-10-PCS | Mod: S$GLB,,, | Performed by: NURSE PRACTITIONER

## 2023-05-09 RX ORDER — ALPRAZOLAM 0.25 MG/1
0.25 TABLET ORAL 2 TIMES DAILY PRN
Qty: 20 TABLET | Refills: 0 | Status: SHIPPED | OUTPATIENT
Start: 2023-05-09 | End: 2023-07-17 | Stop reason: SDUPTHER

## 2023-05-09 RX ORDER — ROSUVASTATIN CALCIUM 10 MG/1
10 TABLET, COATED ORAL DAILY
Qty: 90 TABLET | Refills: 3 | Status: SHIPPED | OUTPATIENT
Start: 2023-05-09 | End: 2024-01-22 | Stop reason: SDUPTHER

## 2023-05-09 RX ORDER — SERTRALINE HYDROCHLORIDE 50 MG/1
50 TABLET, FILM COATED ORAL DAILY
Qty: 30 TABLET | Refills: 11 | Status: SHIPPED | OUTPATIENT
Start: 2023-05-09 | End: 2023-07-17 | Stop reason: SDUPTHER

## 2023-05-09 NOTE — TELEPHONE ENCOUNTER
----- Message from Ann Jovel NP sent at 5/9/2023  1:32 PM CDT -----  Please request pap smear from Dr. Adelita Rodriguez, gyn

## 2023-05-09 NOTE — PROGRESS NOTES
SUBJECTIVE:    Patient ID: Piedad Mcmahon is a 42 y.o. female.    Chief Complaint: Annual Exam (No bottles//Pt is here for her annual exam and medication refills//Pt would like to discuss weight loss. Pt states that she has not tried to lose weight, but loss good amount in the last 2 months//L[place])    Pt here for annual physical- f/u lipids. Last seen by Lorie Posadas NP 3/2022    Pt reports hx of mild anxiety however her anxiety has been much worse over the past several months. Reports feels jittery inside and has been nauseated. No vomiting and reports still eating but doesn't have the appetite she used to- pt reports has lost almost 20lbs in the past several months. At times feels near panic with heart racing and sweating. Has never been treated for anxiety before. Reports has 3 kids, one just graduating from high school- reports she feels she's being pulled in a thousand directions and constantly overstimulated/feeling overwhelmed, easily irritated with her kids. Denies any depression, loss of interest/motivation. No thoughts of suicide, self harm or harming others. Reports her  has significant anxiety as well. Only occasionally drinks ETOH, nonsmoker    Hx of dyslipidemia on statin    Reports saw Dr. Rodriguez, gyn for annual exam- had pap smear and pelvic US and everything looked okay. Still having regular cycles      Telephone on 03/22/2023   Component Date Value Ref Range Status    Cholesterol 05/03/2023 215 (H)  <200 mg/dL Final    HDL 05/03/2023 71  > OR = 50 mg/dL Final    Triglycerides 05/03/2023 102  <150 mg/dL Final    LDL Cholesterol 05/03/2023 124 (H)  mg/dL (calc) Final    HDL/Cholesterol Ratio 05/03/2023 3.0  <5.0 (calc) Final    Non HDL Chol. (LDL+VLDL) 05/03/2023 144 (H)  <130 mg/dL (calc) Final    Glucose 05/03/2023 91  65 - 99 mg/dL Final    BUN 05/03/2023 10  7 - 25 mg/dL Final    Creatinine 05/03/2023 0.76  0.50 - 0.99 mg/dL Final    eGFR 05/03/2023 100  > OR = 60 mL/min/1.73m2 Final     BUN/Creatinine Ratio 05/03/2023 NOT APPLICABLE  6 - 22 (calc) Final    Sodium 05/03/2023 143  135 - 146 mmol/L Final    Potassium 05/03/2023 4.3  3.5 - 5.3 mmol/L Final    Chloride 05/03/2023 105  98 - 110 mmol/L Final    CO2 05/03/2023 28  20 - 32 mmol/L Final    Calcium 05/03/2023 9.4  8.6 - 10.2 mg/dL Final    Total Protein 05/03/2023 7.0  6.1 - 8.1 g/dL Final    Albumin 05/03/2023 4.3  3.6 - 5.1 g/dL Final    Globulin, Total 05/03/2023 2.7  1.9 - 3.7 g/dL (calc) Final    Albumin/Globulin Ratio 05/03/2023 1.6  1.0 - 2.5 (calc) Final    Total Bilirubin 05/03/2023 0.8  0.2 - 1.2 mg/dL Final    Alkaline Phosphatase 05/03/2023 54  31 - 125 U/L Final    AST 05/03/2023 13  10 - 30 U/L Final    ALT 05/03/2023 15  6 - 29 U/L Final    WBC 05/03/2023 6.5  3.8 - 10.8 Thousand/uL Final    RBC 05/03/2023 5.18 (H)  3.80 - 5.10 Million/uL Final    Hemoglobin 05/03/2023 14.4  11.7 - 15.5 g/dL Final    Hematocrit 05/03/2023 43.5  35.0 - 45.0 % Final    MCV 05/03/2023 84.0  80.0 - 100.0 fL Final    MCH 05/03/2023 27.8  27.0 - 33.0 pg Final    MCHC 05/03/2023 33.1  32.0 - 36.0 g/dL Final    RDW 05/03/2023 13.3  11.0 - 15.0 % Final    Platelets 05/03/2023 292  140 - 400 Thousand/uL Final    MPV 05/03/2023 11.9  7.5 - 12.5 fL Final    Neutrophils, Abs 05/03/2023 3,842  1,500 - 7,800 cells/uL Final    Lymph # 05/03/2023 1,983  850 - 3,900 cells/uL Final    Mono # 05/03/2023 553  200 - 950 cells/uL Final    Eos # 05/03/2023 72  15 - 500 cells/uL Final    Baso # 05/03/2023 52  0 - 200 cells/uL Final    Neutrophils Relative 05/03/2023 59.1  % Final    Lymph % 05/03/2023 30.5  % Final    Mono % 05/03/2023 8.5  % Final    Eosinophil % 05/03/2023 1.1  % Final    Basophil % 05/03/2023 0.8  % Final    TSH w/reflex to FT4 05/03/2023 2.97  mIU/L Final    Color, UA 05/03/2023 DARK YELLOW  YELLOW Final    Appearance,  05/03/2023 CLEAR  CLEAR Final    Specific Gravity,  05/03/2023 1.024  1.001 - 1.035 Final    pH,  05/03/2023 6.5  5.0 -  8.0 Final    Glucose, UA 2023 NEGATIVE  NEGATIVE Final    Bilirubin, UA 2023 NEGATIVE  NEGATIVE Final    Ketones, UA 2023 NEGATIVE  NEGATIVE Final    Occult Blood UA 2023 NEGATIVE  NEGATIVE Final    Protein, UA 2023 NEGATIVE  NEGATIVE Final    Nitrite, UA 2023 NEGATIVE  NEGATIVE Final    Leukocytes, UA 2023 NEGATIVE  NEGATIVE Final    WBC Casts, UA 2023 NONE SEEN  < OR = 5 /HPF Final    RBC Casts, UA 2023 NONE SEEN  < OR = 2 /HPF Final    Squam Epithel, UA 2023 6-10 (A)  < OR = 5 /HPF Final    Bacteria, UA 2023 FEW (A)  NONE SEEN /HPF Final    Hyaline Casts, UA 2023 NONE SEEN  NONE SEEN /LPF Final    Service Cmt: 2023    Final    Reflexive Urine Culture 2023    Final       Past Medical History:   Diagnosis Date    Hyperlipidemia      Past Surgical History:   Procedure Laterality Date     SECTION      x3     Family History   Problem Relation Age of Onset    Hypertension Mother     Heart disease Father     Cancer Maternal Grandmother         pancreatic CA    Diabetes Maternal Grandfather     Heart disease Paternal Grandmother     Diabetes Paternal Grandfather        The 10-year CVD risk score (D'Agostino, et al., 2008) is: 3.3%    Values used to calculate the score:      Age: 42 years      Sex: Female      Diabetic: No      Tobacco smoker: No      Systolic Blood Pressure: 134 mmHg      Is BP treated: No      HDL Cholesterol: 71 mg/dL      Total Cholesterol: 215 mg/dL     Marital Status:   Alcohol History:  reports current alcohol use.  Tobacco History:  reports that she has never smoked. She has never been exposed to tobacco smoke. She has never used smokeless tobacco.  Drug History:  has no history on file for drug use.    Health Maintenance Topics with due status: Not Due       Topic Last Completion Date    Influenza Vaccine 2015    Mammogram 2023       There is no immunization history on file for this  "patient.    Review of patient's allergies indicates:  No Known Allergies    Current Outpatient Medications:     ALPRAZolam (XANAX) 0.25 MG tablet, Take 1 tablet (0.25 mg total) by mouth 2 (two) times daily as needed for Anxiety., Disp: 20 tablet, Rfl: 0    rosuvastatin (CRESTOR) 10 MG tablet, Take 1 tablet (10 mg total) by mouth once daily., Disp: 90 tablet, Rfl: 3    sertraline (ZOLOFT) 50 MG tablet, Take 1 tablet (50 mg total) by mouth once daily., Disp: 30 tablet, Rfl: 11    vitamin D (VITAMIN D3) 1000 units Tab, Take 1,000 Units by mouth once daily., Disp: , Rfl:     Review of Systems   Constitutional:  Positive for appetite change (eating less) and unexpected weight change (wt down 18lbs since march 2022). Negative for chills and fever.   HENT:  Negative for sore throat and trouble swallowing.    Respiratory:  Negative for cough, shortness of breath and wheezing.    Cardiovascular:  Negative for chest pain, palpitations and leg swelling.   Gastrointestinal:  Positive for nausea. Negative for abdominal pain, constipation, diarrhea and vomiting.   Genitourinary:  Negative for dysuria, frequency, hematuria, menstrual problem and pelvic pain.   Musculoskeletal:  Negative for back pain and gait problem.   Skin:  Negative for rash.   Neurological:  Negative for dizziness, syncope and numbness.   Psychiatric/Behavioral:  Negative for dysphoric mood, hallucinations and sleep disturbance. The patient is nervous/anxious.         Objective:      Vitals:    05/09/23 0849   BP: 134/84   Pulse: 88   Weight: 72.2 kg (159 lb 3.2 oz)   Height: 5' 3" (1.6 m)     Physical Exam  Vitals reviewed.   Constitutional:       General: She is not in acute distress.     Appearance: Normal appearance. She is well-developed.   HENT:      Head: Normocephalic and atraumatic.      Right Ear: Tympanic membrane and ear canal normal.      Left Ear: Tympanic membrane and ear canal normal.      Mouth/Throat:      Pharynx: No posterior oropharyngeal " erythema.   Neck:      Vascular: No carotid bruit.   Cardiovascular:      Rate and Rhythm: Normal rate and regular rhythm.      Heart sounds: No murmur heard.  Pulmonary:      Effort: Pulmonary effort is normal. No respiratory distress.      Breath sounds: Normal breath sounds. No wheezing or rales.   Abdominal:      General: There is no distension.      Palpations: Abdomen is soft.      Tenderness: There is no abdominal tenderness.   Musculoskeletal:      Cervical back: Neck supple.      Right lower leg: No edema.      Left lower leg: No edema.   Lymphadenopathy:      Cervical: No cervical adenopathy.   Skin:     General: Skin is warm and dry.      Findings: No rash.   Neurological:      General: No focal deficit present.      Mental Status: She is alert and oriented to person, place, and time.      Gait: Gait normal.   Psychiatric:         Mood and Affect: Mood is anxious.         Speech: Speech normal.         Behavior: Behavior is cooperative.         Thought Content: Thought content normal.      Comments: Seems very anxious during visit, near tears at times discussing her anxiety         Assessment:       1. Annual physical exam    2. PAPA (generalized anxiety disorder)    3. Mixed hyperlipidemia           Plan:       1. Annual physical exam   -reviewed recent labs with pt- UTD with mammo and pap    2. PAPA (generalized anxiety disorder)  -pt reports worsening anxiety near panic attacks the past few months- discussed trt options and use of xanax as short term medication prn until daily SSRI can begin to be effective. Discussed side effects and concerns with benzo and advised this will not be a long term medication- advised to avoid drinking or other sedating meds while taking xanax and do not drive until she knows how sedating medication will be. Discussed referral to counselor also if needed. She agrees to starting sertraline and advised to call for any worsening in symptoms otherwise f/u 2 months  -      ALPRAZolam (XANAX) 0.25 MG tablet; Take 1 tablet (0.25 mg total) by mouth 2 (two) times daily as needed for Anxiety.  Dispense: 20 tablet; Refill: 0  -     sertraline (ZOLOFT) 50 MG tablet; Take 1 tablet (50 mg total) by mouth once daily.  Dispense: 30 tablet; Refill: 11    3. Mixed hyperlipidemia  -reviewed recent labs with pt- LDL up slightly to 124, HDL 71- continue current med for now and discussed low fat diet and regular exercise  -     rosuvastatin (CRESTOR) 10 MG tablet; Take 1 tablet (10 mg total) by mouth once daily.  Dispense: 90 tablet; Refill: 3      Follow up in about 2 months (around 7/9/2023). anxiety          Counseled on age and gender appropriate medical preventative services, including cancer screenings, immunizations, overall nutritional health, need for a consistent exercise regimen and an overall push towards maintaining a vigorous and active lifestyle.      5/9/2023 Ann Jovel NP

## 2023-05-09 NOTE — LETTER
[address]  [place] [address]  Phone: [phone]   [phone]                        MD Garett Arteaga MD Chequita Williams, MD Matthew Bassett, JAZMYN Denis NP Jodi Powell, JAZMYN Huber PA-C      Date: 05/09/2023        Patient: Piedad Mcmahon  YOB: 1980    Please fax over pt's most recent pap smear results.      Sincerely,     Allie Dickson MA    Electronically Signed By: Ann Jovel NP

## 2023-05-09 NOTE — TELEPHONE ENCOUNTER
----- Message from Rui Suazo MA sent at 2023  9:34 AM CDT -----  Regardinmo f/u  [phone] per Ann wants to see this pt in 2 months please call the pt when the appt is assigned a slot .

## 2023-07-17 ENCOUNTER — OFFICE VISIT (OUTPATIENT)
Dept: FAMILY MEDICINE | Facility: CLINIC | Age: 43
End: 2023-07-17
Payer: OTHER GOVERNMENT

## 2023-07-17 VITALS
DIASTOLIC BLOOD PRESSURE: 70 MMHG | OXYGEN SATURATION: 95 % | SYSTOLIC BLOOD PRESSURE: 122 MMHG | BODY MASS INDEX: 28.38 KG/M2 | HEART RATE: 75 BPM | WEIGHT: 160.19 LBS | HEIGHT: 63 IN

## 2023-07-17 DIAGNOSIS — Z82.49 FAMILY HISTORY OF PREMATURE CAD: ICD-10-CM

## 2023-07-17 DIAGNOSIS — F41.1 GAD (GENERALIZED ANXIETY DISORDER): Primary | ICD-10-CM

## 2023-07-17 DIAGNOSIS — E78.2 MIXED HYPERLIPIDEMIA: ICD-10-CM

## 2023-07-17 PROCEDURE — 99213 PR OFFICE/OUTPT VISIT, EST, LEVL III, 20-29 MIN: ICD-10-PCS | Mod: S$GLB,,, | Performed by: NURSE PRACTITIONER

## 2023-07-17 PROCEDURE — 99213 OFFICE O/P EST LOW 20 MIN: CPT | Mod: S$GLB,,, | Performed by: NURSE PRACTITIONER

## 2023-07-17 RX ORDER — ALPRAZOLAM 0.25 MG/1
0.25 TABLET ORAL 2 TIMES DAILY PRN
Qty: 30 TABLET | Refills: 2 | Status: SHIPPED | OUTPATIENT
Start: 2023-07-17

## 2023-07-17 RX ORDER — SERTRALINE HYDROCHLORIDE 50 MG/1
50 TABLET, FILM COATED ORAL DAILY
Qty: 90 TABLET | Refills: 3 | Status: SHIPPED | OUTPATIENT
Start: 2023-07-17 | End: 2024-07-16

## 2023-07-17 NOTE — PROGRESS NOTES
SUBJECTIVE:    Patient ID: Piedad Mcmahon is a 42 y.o. female.    Chief Complaint: Follow-up (Bottles brought//Pt is here for a check up and medication refills//Alee )    Pt here for PAPA f/u.     Pt started on sertraline and xanax at last visit d/t worsening anxiety. Reports since starting medication she's feeling much better- still gets a little anxiety with driving but overall much better.  Has only taken Xanax occasionally in the evenings which does help her relax.  Initially had some nausea with sertraline but that has now resolved.    Hx of dyslipidemia on statin.  Patient's father with history of premature CAD at age 51.  She states she saw Cardiology approximately 5 [place], at that time had a calcium score which was 0.  She is asymptomatic though asking if she should follow-up with Cardiology      Telephone on 03/22/2023   Component Date Value Ref Range Status    Cholesterol 05/03/2023 215 (H)  <200 mg/dL Final    HDL 05/03/2023 71  > OR = 50 mg/dL Final    Triglycerides 05/03/2023 102  <150 mg/dL Final    LDL Cholesterol 05/03/2023 124 (H)  mg/dL (calc) Final    HDL/Cholesterol Ratio 05/03/2023 3.0  <5.0 (calc) Final    Non HDL Chol. (LDL+VLDL) 05/03/2023 144 (H)  <130 mg/dL (calc) Final    Glucose 05/03/2023 91  65 - 99 mg/dL Final    BUN 05/03/2023 10  7 - 25 mg/dL Final    Creatinine 05/03/2023 0.76  0.50 - 0.99 mg/dL Final    eGFR 05/03/2023 100  > OR = 60 mL/min/1.73m2 Final    BUN/Creatinine Ratio 05/03/2023 NOT APPLICABLE  6 - 22 (calc) Final    Sodium 05/03/2023 143  135 - 146 mmol/L Final    Potassium 05/03/2023 4.3  3.5 - 5.3 mmol/L Final    Chloride 05/03/2023 105  98 - 110 mmol/L Final    CO2 05/03/2023 28  20 - 32 mmol/L Final    Calcium 05/03/2023 9.4  8.6 - 10.2 mg/dL Final    Total Protein 05/03/2023 7.0  6.1 - 8.1 g/dL Final    Albumin 05/03/2023 4.3  3.6 - 5.1 g/dL Final    Globulin, Total 05/03/2023 2.7  1.9 - 3.7 g/dL (calc) Final    Albumin/Globulin  Ratio 05/03/2023 1.6  1.0 - 2.5 (calc) Final    Total Bilirubin 05/03/2023 0.8  0.2 - 1.2 mg/dL Final    Alkaline Phosphatase 05/03/2023 54  31 - 125 U/L Final    AST 05/03/2023 13  10 - 30 U/L Final    ALT 05/03/2023 15  6 - 29 U/L Final    WBC 05/03/2023 6.5  3.8 - 10.8 Thousand/uL Final    RBC 05/03/2023 5.18 (H)  3.80 - 5.10 Million/uL Final    Hemoglobin 05/03/2023 14.4  11.7 - 15.5 g/dL Final    Hematocrit 05/03/2023 43.5  35.0 - 45.0 % Final    MCV 05/03/2023 84.0  80.0 - 100.0 fL Final    MCH 05/03/2023 27.8  27.0 - 33.0 pg Final    MCHC 05/03/2023 33.1  32.0 - 36.0 g/dL Final    RDW 05/03/2023 13.3  11.0 - 15.0 % Final    Platelets 05/03/2023 292  140 - 400 Thousand/uL Final    MPV 05/03/2023 11.9  7.5 - 12.5 fL Final    Neutrophils, Abs 05/03/2023 3,842  1,500 - 7,800 cells/uL Final    Lymph # 05/03/2023 1,983  850 - 3,900 cells/uL Final    Mono # 05/03/2023 553  200 - 950 cells/uL Final    Eos # 05/03/2023 72  15 - 500 cells/uL Final    Baso # 05/03/2023 52  0 - 200 cells/uL Final    Neutrophils Relative 05/03/2023 59.1  % Final    Lymph % 05/03/2023 30.5  % Final    Mono % 05/03/2023 8.5  % Final    Eosinophil % 05/03/2023 1.1  % Final    Basophil % 05/03/2023 0.8  % Final    TSH w/reflex to FT4 05/03/2023 2.97  mIU/L Final    Color, UA 05/03/2023 DARK YELLOW  YELLOW Final    Appearance, UA 05/03/2023 CLEAR  CLEAR Final    Specific Gravity, UA 05/03/2023 1.024  1.001 - 1.035 Final    pH, UA 05/03/2023 6.5  5.0 - 8.0 Final    Glucose, UA 05/03/2023 NEGATIVE  NEGATIVE Final    Bilirubin, UA 05/03/2023 NEGATIVE  NEGATIVE Final    Ketones, UA 05/03/2023 NEGATIVE  NEGATIVE Final    Occult Blood UA 05/03/2023 NEGATIVE  NEGATIVE Final    Protein, UA 05/03/2023 NEGATIVE  NEGATIVE Final    Nitrite, UA 05/03/2023 NEGATIVE  NEGATIVE Final    Leukocytes, UA 05/03/2023 NEGATIVE  NEGATIVE Final    WBC Casts, UA 05/03/2023 NONE SEEN  < OR = 5 /HPF Final    RBC Casts, UA 05/03/2023 NONE SEEN  < OR = 2 /HPF Final     Jesus Epithel, UA 2023 6-10 (A)  < OR = 5 /HPF Final    Bacteria, UA 2023 FEW (A)  NONE SEEN /HPF Final    Hyaline Casts, UA 2023 NONE SEEN  NONE SEEN /LPF Final    Service Cmt: 2023    Final    Reflexive Urine Culture 2023    Final       Past Medical History:   Diagnosis Date    Hyperlipidemia      Past Surgical History:   Procedure Laterality Date     SECTION      x3     Family History   Problem Relation Age of Onset    Hypertension Mother     Heart disease Father 51        CAD    Cancer Maternal Grandmother         pancreatic CA    Diabetes Maternal Grandfather     Heart disease Paternal Grandmother     Diabetes Paternal Grandfather        The 10-year CVD risk score (Deonte, et al., 2008) is: 2.5%    Values used to calculate the score:      Age: 42 years      Sex: Female      Diabetic: No      Tobacco smoker: No      Systolic Blood Pressure: 122 mmHg      Is BP treated: No      HDL Cholesterol: 71 mg/dL      Total Cholesterol: 215 mg/dL     Marital Status:   Alcohol History:  reports current alcohol use.  Tobacco History:  reports that she has never smoked. She has never been exposed to tobacco smoke. She has never used smokeless tobacco.  Drug History:  has no history on file for drug use.    Health Maintenance Topics with due status: Not Due       Topic Last Completion Date    Influenza Vaccine 2015    Cervical Cancer Screening 2022    Mammogram 2023       There is no immunization history on file for this patient.    Review of patient's allergies indicates:  No Known Allergies    Current Outpatient Medications:     rosuvastatin (CRESTOR) 10 MG tablet, Take 1 tablet (10 mg total) by mouth once daily., Disp: 90 tablet, Rfl: 3    vitamin D (VITAMIN D3) 1000 units Tab, Take 1,000 Units by mouth once daily., Disp: , Rfl:     ALPRAZolam (XANAX) 0.25 MG tablet, Take 1 tablet (0.25 mg total) by mouth 2 (two) times daily as needed for Anxiety., Disp: 30  "tablet, Rfl: 2    sertraline (ZOLOFT) 50 MG tablet, Take 1 tablet (50 mg total) by mouth once daily., Disp: 90 tablet, Rfl: 3    Review of Systems   Constitutional:  Negative for appetite change, chills, fever and unexpected weight change.   Respiratory:  Negative for cough, shortness of breath and wheezing.    Cardiovascular:  Negative for chest pain, palpitations and leg swelling.   Gastrointestinal:  Negative for abdominal pain, constipation, diarrhea, nausea and vomiting.   Genitourinary:  Negative for dysuria, frequency, hematuria, menstrual problem and pelvic pain.   Skin:  Negative for rash.   Neurological:  Negative for dizziness, syncope and numbness.   Psychiatric/Behavioral:  Negative for dysphoric mood, hallucinations and sleep disturbance. The patient is nervous/anxious (improved with meds).         Objective:      Vitals:    07/17/23 1156   BP: 122/70   Pulse: 75   SpO2: 95%   Weight: 72.7 kg (160 lb 3.2 oz)   Height: 5' 3" (1.6 m)     Physical Exam  Vitals reviewed.   Constitutional:       General: She is not in acute distress.     Appearance: Normal appearance. She is well-developed.   HENT:      Head: Normocephalic and atraumatic.   Neck:      Vascular: No carotid bruit.   Cardiovascular:      Rate and Rhythm: Normal rate and regular rhythm.      Heart sounds: No murmur heard.  Pulmonary:      Effort: Pulmonary effort is normal. No respiratory distress.      Breath sounds: Normal breath sounds. No wheezing or rales.   Abdominal:      General: There is no distension.      Palpations: Abdomen is soft.      Tenderness: There is no abdominal tenderness.   Musculoskeletal:      Cervical back: Neck supple.      Right lower leg: No edema.      Left lower leg: No edema.   Lymphadenopathy:      Cervical: No cervical adenopathy.   Skin:     General: Skin is warm and dry.      Findings: No rash.   Neurological:      General: No focal deficit present.      Mental Status: She is alert and oriented to person, " place, and time.      Gait: Gait normal.   Psychiatric:         Mood and Affect: Mood normal.         Speech: Speech normal.         Behavior: Behavior is cooperative.         Thought Content: Thought content normal.         Assessment:       1. PAPA (generalized anxiety disorder)    2. Mixed hyperlipidemia    3. Family history of premature CAD           Plan:       1. PAPA (generalized anxiety disorder)  -overall improved with addition of sertraline, continue current medications and may continue to use Xanax though sparingly  -     ALPRAZolam (XANAX) 0.25 MG tablet; Take 1 tablet (0.25 mg total) by mouth 2 (two) times daily as needed for Anxiety.  Dispense: 30 tablet; Refill: 2  -     sertraline (ZOLOFT) 50 MG tablet; Take 1 tablet (50 mg total) by mouth once daily.  Dispense: 90 tablet; Refill: 3    2. Mixed hyperlipidemia   -LDL up slightly on last labs, encouraged to continue to work on healthy diet and exercise    3. Family history of premature CAD   -father history of MI at age 51.  Patient reports she had a calcium score proximally 5 years ago which was 0.  Currently asymptomatic     Follow up in about 6 months (around 1/17/2024).          Counseled on age and gender appropriate medical preventative services, including cancer screenings, immunizations, overall nutritional health, need for a consistent exercise regimen and an overall push towards maintaining a vigorous and active lifestyle.      7/17/2023 Ann Jovel NP

## 2024-01-03 ENCOUNTER — TELEPHONE (OUTPATIENT)
Dept: FAMILY MEDICINE | Facility: CLINIC | Age: 44
End: 2024-01-03
Payer: OTHER GOVERNMENT

## 2024-01-03 DIAGNOSIS — Z79.899 ENCOUNTER FOR LONG-TERM (CURRENT) USE OF OTHER MEDICATIONS: ICD-10-CM

## 2024-01-03 DIAGNOSIS — E78.2 MIXED HYPERLIPIDEMIA: Primary | ICD-10-CM

## 2024-01-13 LAB
ALBUMIN SERPL-MCNC: 4.2 G/DL (ref 3.6–5.1)
ALBUMIN/GLOB SERPL: 1.5 (CALC) (ref 1–2.5)
ALP SERPL-CCNC: 62 U/L (ref 31–125)
ALT SERPL-CCNC: 18 U/L (ref 6–29)
AST SERPL-CCNC: 14 U/L (ref 10–30)
BILIRUB SERPL-MCNC: 0.5 MG/DL (ref 0.2–1.2)
BUN SERPL-MCNC: 8 MG/DL (ref 7–25)
BUN/CREAT SERPL: NORMAL (CALC) (ref 6–22)
CALCIUM SERPL-MCNC: 9.3 MG/DL (ref 8.6–10.2)
CHLORIDE SERPL-SCNC: 103 MMOL/L (ref 98–110)
CHOLEST SERPL-MCNC: 226 MG/DL
CHOLEST/HDLC SERPL: 2.9 (CALC)
CO2 SERPL-SCNC: 26 MMOL/L (ref 20–32)
CREAT SERPL-MCNC: 0.59 MG/DL (ref 0.5–0.99)
EGFR: 114 ML/MIN/1.73M2
GLOBULIN SER CALC-MCNC: 2.8 G/DL (CALC) (ref 1.9–3.7)
GLUCOSE SERPL-MCNC: 92 MG/DL (ref 65–99)
HDLC SERPL-MCNC: 77 MG/DL
LDLC SERPL CALC-MCNC: 134 MG/DL (CALC)
NONHDLC SERPL-MCNC: 149 MG/DL (CALC)
POTASSIUM SERPL-SCNC: 4.5 MMOL/L (ref 3.5–5.3)
PROT SERPL-MCNC: 7 G/DL (ref 6.1–8.1)
SODIUM SERPL-SCNC: 138 MMOL/L (ref 135–146)
TRIGL SERPL-MCNC: 59 MG/DL

## 2024-01-15 ENCOUNTER — HOSPITAL ENCOUNTER (EMERGENCY)
Facility: HOSPITAL | Age: 44
Discharge: HOME | End: 2024-01-15
Attending: EMERGENCY MEDICINE
Payer: OTHER GOVERNMENT

## 2024-01-15 VITALS
DIASTOLIC BLOOD PRESSURE: 82 MMHG | TEMPERATURE: 98 F | HEART RATE: 100 BPM | HEIGHT: 63 IN | RESPIRATION RATE: 20 BRPM | BODY MASS INDEX: 28.35 KG/M2 | WEIGHT: 160 LBS | OXYGEN SATURATION: 99 % | SYSTOLIC BLOOD PRESSURE: 140 MMHG

## 2024-01-15 DIAGNOSIS — S99.911A RIGHT ANKLE INJURY: ICD-10-CM

## 2024-01-15 DIAGNOSIS — S93.401A SPRAIN OF RIGHT ANKLE, UNSPECIFIED LIGAMENT, INITIAL ENCOUNTER: Primary | ICD-10-CM

## 2024-01-15 PROCEDURE — 99284 EMERGENCY DEPT VISIT MOD MDM: CPT

## 2024-01-15 PROCEDURE — 25000003 PHARM REV CODE 250: Performed by: PHYSICIAN ASSISTANT

## 2024-01-15 RX ORDER — ACETAMINOPHEN 500 MG
1000 TABLET ORAL
Status: COMPLETED | OUTPATIENT
Start: 2024-01-15 | End: 2024-01-15

## 2024-01-15 RX ORDER — IBUPROFEN 600 MG/1
600 TABLET ORAL EVERY 8 HOURS PRN
Qty: 20 TABLET | Refills: 0 | Status: SHIPPED | OUTPATIENT
Start: 2024-01-15

## 2024-01-15 RX ORDER — HYDROCODONE BITARTRATE AND ACETAMINOPHEN 5; 325 MG/1; MG/1
1 TABLET ORAL EVERY 6 HOURS PRN
Qty: 12 TABLET | Refills: 0 | Status: SHIPPED | OUTPATIENT
Start: 2024-01-15 | End: 2024-01-18

## 2024-01-15 RX ORDER — OXYCODONE HYDROCHLORIDE 5 MG/1
5 TABLET ORAL
Status: COMPLETED | OUTPATIENT
Start: 2024-01-15 | End: 2024-01-15

## 2024-01-15 RX ADMIN — ACETAMINOPHEN 1000 MG: 500 TABLET ORAL at 10:01

## 2024-01-15 RX ADMIN — OXYCODONE 5 MG: 5 TABLET ORAL at 10:01

## 2024-01-16 NOTE — ED PROVIDER NOTES
Encounter Date: 1/15/2024       History     Chief Complaint   Patient presents with    Ankle Injury     Rt. Ankle last night      Piedad Mcmahon is a 43 y.o. female presenting for evaluation of right ankle pain, swelling and bruising since twisting her ankle, while stepping down out of her 's jeep yesterday.  She is unable to bear weight, due to the pain.  No fever, no chills.  No numbness, tingling or weakness.   She has a past medical history of Hyperlipidemia.      The history is provided by the patient.     Review of patient's allergies indicates:  No Known Allergies  Past Medical History:   Diagnosis Date    Hyperlipidemia      Past Surgical History:   Procedure Laterality Date     SECTION      x3     Family History   Problem Relation Age of Onset    Hypertension Mother     Heart disease Father 51        CAD    Cancer Maternal Grandmother         pancreatic CA    Diabetes Maternal Grandfather     Heart disease Paternal Grandmother     Diabetes Paternal Grandfather      Social History     Tobacco Use    Smoking status: Never     Passive exposure: Never    Smokeless tobacco: Never   Substance Use Topics    Alcohol use: Yes     Review of Systems   Constitutional:  Negative for chills and fever.   Musculoskeletal:  Positive for arthralgias, joint swelling and myalgias. Negative for back pain, neck pain and neck stiffness.   Skin:  Negative for color change, pallor, rash and wound.   Neurological:  Negative for weakness and numbness.   Hematological:  Does not bruise/bleed easily.       Physical Exam     Initial Vitals [01/15/24 0925]   BP Pulse Resp Temp SpO2   (!) 140/82 100 20 97.7 °F (36.5 °C) 99 %      MAP       --         Physical Exam    Nursing note and vitals reviewed.  Constitutional: She appears well-developed and well-nourished. She is not diaphoretic. No distress.   HENT:   Head: Normocephalic and atraumatic.   Cardiovascular:  Intact distal pulses.           Musculoskeletal:          General: Tenderness present. Normal range of motion.      Comments: TTP, swelling and ecchymosis noted to right lateral malleolus, extending into right ATF and dorsal right foot.  Palpable 2+ pedal pulse.      Neurological: She is alert and oriented to person, place, and time. She has normal strength. No sensory deficit.   Skin: Skin is warm and dry. No rash and no abscess noted. No erythema.   Psychiatric: She has a normal mood and affect.         ED Course   Procedures  Labs Reviewed - No data to display       Imaging Results              X-Ray Foot Complete Right (Final result)  Result time 01/15/24 10:07:37      Final result by Trey Ahuja MD (01/15/24 10:07:37)                   Impression:      Soft tissue swelling without acute fracture.      Electronically signed by: Trey Ahuja  Date:    01/15/2024  Time:    10:07               Narrative:    EXAMINATION:  XR FOOT COMPLETE 3 VIEW RIGHT    CLINICAL HISTORY:  . Unspecified injury of right ankle, initial encounter    TECHNIQUE:  AP, lateral, and oblique views of the right foot were performed.    COMPARISON:  Same day and 08/10/2020.    FINDINGS:  Mild hallux valgus deformity mild degenerative osteoarthrosis of the 1st MTP joint.  Tarsal bones intact with normal tarsometatarsal alignment.  Small plantar calcaneal spur.  Soft tissue swelling at the ankle.                                       X-Ray Ankle Complete Right (Final result)  Result time 01/15/24 10:03:19      Final result by Trey Ahuja MD (01/15/24 10:03:19)                   Narrative:    EXAMINATION:  jXR ANKLE COMPLETE 3 VIEW RIGHT    CLINICAL HISTORY:  Unspecified injury of right ankle, initial encounter    TECHNIQUE:  AP, lateral, and oblique images of the right ankle were performed.    COMPARISON:  None    FINDINGS:  Soft tissue swelling overlying the lateral malleolus.  No underlying bony fracture.  Tibiotalar articulation intact.  Small plantar calcaneal  zahira.      Electronically signed by: Trey Ahuja  Date:    01/15/2024  Time:    10:03                                     Medications   acetaminophen tablet 1,000 mg (1,000 mg Oral Given 1/15/24 1027)   oxyCODONE immediate release tablet 5 mg (5 mg Oral Given 1/15/24 1027)     Medical Decision Making  Differential Diagnosis:  Fracture  Dislocation  Sprain  Contusion  Strain  Spasm      Pt emergently evaluated here in the ED.    X-rays of right ankle and foot show no acute bony abnormality, fracture or dislocations.  She will be placed in a walking boot.  She is discharged home to follow-up with orthopedics or podiatry for re-evaluation.  Patient voices understanding and is agreeable to the plan.  Specific return precautions are given.       Amount and/or Complexity of Data Reviewed  Radiology: ordered. Decision-making details documented in ED Course.    Risk  OTC drugs.  Prescription drug management.                                      Clinical Impression:  Final diagnoses:  [S99.911A] Right ankle injury  [S93.401A] Sprain of right ankle, unspecified ligament, initial encounter (Primary)          ED Disposition Condition    Discharge Stable          ED Prescriptions       Medication Sig Dispense Start Date End Date Auth. Provider    HYDROcodone-acetaminophen (NORCO) 5-325 mg per tablet Take 1 tablet by mouth every 6 (six) hours as needed for Pain. 12 tablet 1/15/2024 1/18/2024 Lana Lopez PA-C    ibuprofen (ADVIL,MOTRIN) 600 MG tablet Take 1 tablet (600 mg total) by mouth every 8 (eight) hours as needed for Pain. 20 tablet 1/15/2024 -- Lana Lopez PA-C          Follow-up Information       Follow up With Specialties Details Why Contact Info Additional Information    [facility] - ED Emergency Medicine  As needed, If symptoms worsen [address] [place] [address] Dr Tomer Leung [phone] 1st floor    Orthopedics or Podiatry   for re-evaluation if pain persists or worsens                Lana Lopez PA-C  01/15/24 3560

## 2024-01-22 ENCOUNTER — OFFICE VISIT (OUTPATIENT)
Dept: FAMILY MEDICINE | Facility: CLINIC | Age: 44
End: 2024-01-22
Payer: OTHER GOVERNMENT

## 2024-01-22 VITALS
WEIGHT: 158 LBS | HEART RATE: 79 BPM | OXYGEN SATURATION: 98 % | DIASTOLIC BLOOD PRESSURE: 78 MMHG | HEIGHT: 63 IN | SYSTOLIC BLOOD PRESSURE: 124 MMHG | BODY MASS INDEX: 28 KG/M2

## 2024-01-22 DIAGNOSIS — Z00.00 ANNUAL PHYSICAL EXAM: ICD-10-CM

## 2024-01-22 DIAGNOSIS — S93.401D SPRAIN OF RIGHT ANKLE, UNSPECIFIED LIGAMENT, SUBSEQUENT ENCOUNTER: ICD-10-CM

## 2024-01-22 DIAGNOSIS — F41.1 GAD (GENERALIZED ANXIETY DISORDER): Primary | ICD-10-CM

## 2024-01-22 DIAGNOSIS — E78.2 MIXED HYPERLIPIDEMIA: ICD-10-CM

## 2024-01-22 PROCEDURE — 99396 PREV VISIT EST AGE 40-64: CPT | Mod: S$GLB,,, | Performed by: NURSE PRACTITIONER

## 2024-01-22 RX ORDER — ROSUVASTATIN CALCIUM 20 MG/1
20 TABLET, COATED ORAL DAILY
Qty: 90 TABLET | Refills: 1 | Status: SHIPPED | OUTPATIENT
Start: 2024-01-22
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
## 2024-04-03 DIAGNOSIS — Z12.31 OTHER SCREENING MAMMOGRAM: ICD-10-CM

## 2024-04-08 ENCOUNTER — PATIENT MESSAGE (OUTPATIENT)
Dept: ADMINISTRATIVE | Facility: HOSPITAL | Age: 44
End: 2024-04-08
Payer: OTHER GOVERNMENT

## 2024-04-24 ENCOUNTER — PATIENT MESSAGE (OUTPATIENT)
Dept: FAMILY MEDICINE | Facility: CLINIC | Age: 44
End: 2024-04-24
Payer: OTHER GOVERNMENT

## 2024-04-24 DIAGNOSIS — F41.1 GAD (GENERALIZED ANXIETY DISORDER): ICD-10-CM

## 2024-04-24 RX ORDER — SERTRALINE HYDROCHLORIDE 100 MG/1
100 TABLET, FILM COATED ORAL DAILY
Qty: 90 TABLET | Refills: 3 | Status: SHIPPED | OUTPATIENT
Start: 2024-04-24 | End: 2025-04-24

## 2024-04-24 NOTE — TELEPHONE ENCOUNTER
Please Let pt know we can increase sertraline dose to 100mg however if GI symptoms persist need to be seen

## 2024-06-25 ENCOUNTER — TELEPHONE (OUTPATIENT)
Dept: FAMILY MEDICINE | Facility: CLINIC | Age: 44
End: 2024-06-25
Payer: OTHER GOVERNMENT

## 2024-07-22 ENCOUNTER — OFFICE VISIT (OUTPATIENT)
Dept: FAMILY MEDICINE | Facility: CLINIC | Age: 44
End: 2024-07-22
Payer: OTHER GOVERNMENT

## 2024-07-22 VITALS
DIASTOLIC BLOOD PRESSURE: 70 MMHG | SYSTOLIC BLOOD PRESSURE: 122 MMHG | WEIGHT: 154.81 LBS | BODY MASS INDEX: 27.43 KG/M2 | HEIGHT: 63 IN | HEART RATE: 85 BPM | OXYGEN SATURATION: 98 %

## 2024-07-22 DIAGNOSIS — E78.2 MIXED HYPERLIPIDEMIA: ICD-10-CM

## 2024-07-22 DIAGNOSIS — F41.1 GAD (GENERALIZED ANXIETY DISORDER): Primary | ICD-10-CM

## 2024-07-22 PROCEDURE — 99396 PREV VISIT EST AGE 40-64: CPT | Mod: S$GLB,,, | Performed by: NURSE PRACTITIONER

## 2024-07-22 RX ORDER — ROSUVASTATIN CALCIUM 20 MG/1
20 TABLET, COATED ORAL DAILY
Qty: 90 TABLET | Refills: 1 | Status: SHIPPED | OUTPATIENT
Start: 2024-07-22

## 2024-07-22 RX ORDER — ALPRAZOLAM 0.25 MG/1
0.25 TABLET ORAL 2 TIMES DAILY PRN
Qty: 30 TABLET | Refills: 2 | Status: SHIPPED | OUTPATIENT
Start: 2024-07-22
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
## 2024-07-29 ENCOUNTER — HOSPITAL ENCOUNTER (OUTPATIENT)
Dept: RADIOLOGY | Facility: HOSPITAL | Age: 44
Discharge: HOME | End: 2024-07-29
Attending: NURSE PRACTITIONER
Payer: OTHER GOVERNMENT

## 2024-07-29 VITALS — WEIGHT: 154 LBS | HEIGHT: 63 IN | BODY MASS INDEX: 27.29 KG/M2

## 2024-07-29 DIAGNOSIS — Z12.31 OTHER SCREENING MAMMOGRAM: ICD-10-CM

## 2024-07-29 PROCEDURE — 77067 SCR MAMMO BI INCL CAD: CPT | Mod: 26,,, | Performed by: RADIOLOGY

## 2024-07-29 PROCEDURE — 77063 BREAST TOMOSYNTHESIS BI: CPT | Mod: TC,PO

## 2024-07-29 PROCEDURE — 77063 BREAST TOMOSYNTHESIS BI: CPT | Mod: 26,,, | Performed by: RADIOLOGY

## 2024-07-29 PROCEDURE — 77067 SCR MAMMO BI INCL CAD: CPT | Mod: TC,PO

## 2024-11-27 DIAGNOSIS — F41.1 GAD (GENERALIZED ANXIETY DISORDER): ICD-10-CM

## 2024-11-27 RX ORDER — ALPRAZOLAM 0.25 MG/1
0.25 TABLET ORAL 2 TIMES DAILY PRN
Qty: 30 TABLET | Refills: 2 | Status: SHIPPED | OUTPATIENT
Start: 2024-11-27

## 2025-01-08 ENCOUNTER — TELEPHONE (OUTPATIENT)
Dept: FAMILY MEDICINE | Facility: CLINIC | Age: 45
End: 2025-01-08
Payer: OTHER GOVERNMENT

## 2025-01-22 DIAGNOSIS — E78.2 MIXED HYPERLIPIDEMIA: ICD-10-CM

## 2025-01-22 RX ORDER — ROSUVASTATIN CALCIUM 20 MG/1
TABLET, COATED ORAL
Qty: 90 TABLET | Refills: 1 | Status: SHIPPED | OUTPATIENT
Start: 2025-01-22

## 2025-01-24 ENCOUNTER — TELEPHONE (OUTPATIENT)
Dept: FAMILY MEDICINE | Facility: CLINIC | Age: 45
End: 2025-01-24
Payer: OTHER GOVERNMENT

## 2025-01-24 NOTE — TELEPHONE ENCOUNTER
----- Message from Med Assistant Darnell sent at 1/24/2025 12:48 PM CST -----  + urine culture, enterococcus faecalis

## 2025-01-27 ENCOUNTER — E-VISIT (OUTPATIENT)
Dept: FAMILY MEDICINE | Facility: CLINIC | Age: 45
End: 2025-01-27
Payer: OTHER GOVERNMENT

## 2025-01-27 DIAGNOSIS — B37.31 VAGINAL CANDIDIASIS: Primary | ICD-10-CM

## 2025-01-27 RX ORDER — FLUCONAZOLE 150 MG/1
150 TABLET ORAL DAILY
Qty: 1 TABLET | Refills: 0 | Status: SHIPPED | OUTPATIENT
Start: 2025-01-27 | End: 2025-01-28

## 2025-01-27 NOTE — PROGRESS NOTES
Patient ID: Piedad Mcmahon is a 44 y.o. female.    Chief Complaint: General Illness (Entered automatically based on patient selection in [employer].)    The patient initiated a request through [employer] on 1/27/2025 for evaluation and management with a chief complaint of General Illness (Entered automatically based on patient selection in [employer].)     I evaluated the questionnaire submission on 1/27/2025.    [facility]    1/27/2025  4:44 PM CST - Filed by Patient   What do you need help with? Vaginal Symptoms   Do you agree to participate in an E-Visit? Yes   If you have any of the following symptoms,  please do not complete an E-Visit,  schedule an appointment with your provider: I acknowledge   Do you have any of the following pregnancy-related conditions? None   What is the main issue you would like addressed today? Yeast infection   Which of the following vaginal concerns do you have? Discharge;  Itching   Do you have vaginal discharge? White/milky discharge   Do you have pain while passing urine? No   Do you have any of the following symptoms? None of the above    Have you taken antibiotics in the last two weeks? No    Do you use any of the following? Bubble baths   Which of the following applies to your menstrual period? Had in the last 2 weeks   Which of the following applies to your menstrual cycle? Normal amount of bleeding   Do you have spotting between periods? No   Do you have pain with your period? No   Have you had similar symptoms in the past? No   Have you had a temperature of 100.4 or higher? No   Provide any additional information you feel is important.    Please attach any relevant images or files    Are you able to take your vital signs? No         Encounter Diagnosis   Name Primary?    Vaginal candidiasis Yes        No orders of the defined types were placed in this encounter.     Medications Ordered This Encounter   Medications    fluconazole (DIFLUCAN) 150 MG Tab      Sig: Take 1 tablet (150 mg total) by mouth once daily. for 1 day     Dispense:  1 tablet     Refill:  0        Follow up if symptoms worsen or fail to improve.      E-Visit Time Trackin min

## 2025-01-29 ENCOUNTER — OFFICE VISIT (OUTPATIENT)
Dept: FAMILY MEDICINE | Facility: CLINIC | Age: 45
End: 2025-01-29
Payer: OTHER GOVERNMENT

## 2025-01-29 VITALS
DIASTOLIC BLOOD PRESSURE: 80 MMHG | OXYGEN SATURATION: 97 % | HEART RATE: 76 BPM | BODY MASS INDEX: 26.93 KG/M2 | SYSTOLIC BLOOD PRESSURE: 122 MMHG | HEIGHT: 63 IN | WEIGHT: 152 LBS

## 2025-01-29 DIAGNOSIS — Z72.0 CURRENT EVERY DAY NICOTINE VAPING: ICD-10-CM

## 2025-01-29 DIAGNOSIS — F41.1 GAD (GENERALIZED ANXIETY DISORDER): ICD-10-CM

## 2025-01-29 DIAGNOSIS — Z00.00 ANNUAL PHYSICAL EXAM: Primary | ICD-10-CM

## 2025-01-29 DIAGNOSIS — B00.1 FEVER BLISTER: ICD-10-CM

## 2025-01-29 DIAGNOSIS — E78.2 MIXED HYPERLIPIDEMIA: ICD-10-CM

## 2025-01-29 DIAGNOSIS — Z12.11 COLON CANCER SCREENING: ICD-10-CM

## 2025-01-29 DIAGNOSIS — Z82.49 FAMILY HISTORY OF PREMATURE CAD: ICD-10-CM

## 2025-01-29 RX ORDER — VALACYCLOVIR HYDROCHLORIDE 1 G/1
TABLET, FILM COATED ORAL
Qty: 30 TABLET | Refills: 1 | Status: SHIPPED | OUTPATIENT
Start: 2025-01-29

## 2025-01-29 NOTE — PROGRESS NOTES
"  SUBJECTIVE:    Patient ID: Piedad Mcmahon is a 44 y.o. female.    Chief Complaint: Follow-up (No bottles//Pt is here for a check up and to discuss medication/refills//Pt decline flu vaccine//LÁZARO )    Pt here for annual physical- PAAP, lipids f/u.     Pt reports overall she's been doing okay.     She had sent a message a few days ago c/oing of vaginal itching and slight discharge- was sent in RX for diflucan and all symptoms have resolved. Reports having regular cycles. No pelvic pain. UTD with pap with Dr. Rodriguez    Reports no longer taking xanax- reports had found herself taking xanax much more often a few months ago and  noted she seemed to be "off", more forgetful so she stopped xanax completely. Admits she actually quit taking all her meds at that time in November but has since restarted sertraline and statin. Continues with some anxiety issues and admits would like to see a therapist    Admits has started vaping occasionally          No visits with results within 6 Month(s) from this visit.   Latest known visit with results is:   Office Visit on 01/22/2024   Component Date Value Ref Range Status    WBC 01/17/2025 7.5  3.8 - 10.8 Thousand/uL Final    RBC 01/17/2025 5.21 (H)  3.80 - 5.10 Million/uL Final    Hemoglobin 01/17/2025 14.6  11.7 - 15.5 g/dL Final    Hematocrit 01/17/2025 45.8 (H)  35.0 - 45.0 % Final    MCV 01/17/2025 87.9  80.0 - 100.0 fL Final    MCH 01/17/2025 28.0  27.0 - 33.0 pg Final    MCHC 01/17/2025 31.9 (L)  32.0 - 36.0 g/dL Final    RDW 01/17/2025 13.0  11.0 - 15.0 % Final    Platelets 01/17/2025 329  140 - 400 Thousand/uL Final    MPV 01/17/2025 11.5  7.5 - 12.5 fL Final    Neutrophils, Abs 01/17/2025 4,328  1,500 - 7,800 cells/uL Final    Lymph # 01/17/2025 2,190  850 - 3,900 cells/uL Final    Mono # 01/17/2025 818  200 - 950 cells/uL Final    Eos # 01/17/2025 113  15 - 500 cells/uL Final    Baso # 01/17/2025 53  0 - 200 cells/uL Final    Neutrophils Relative 01/17/2025 57.7  % " Final    Lymph % 01/17/2025 29.2  % Final    Mono % 01/17/2025 10.9  % Final    Eosinophil % 01/17/2025 1.5  % Final    Basophil % 01/17/2025 0.7  % Final    Glucose 01/17/2025 95  65 - 99 mg/dL Final    BUN 01/17/2025 7  7 - 25 mg/dL Final    Creatinine 01/17/2025 0.77  0.50 - 0.99 mg/dL Final    eGFR 01/17/2025 97  > OR = 60 mL/min/1.73m2 Final    BUN/Creatinine Ratio 01/17/2025 SEE NOTE:  6 - 22 (calc) Final    Sodium 01/17/2025 138  135 - 146 mmol/L Final    Potassium 01/17/2025 4.3  3.5 - 5.3 mmol/L Final    Chloride 01/17/2025 100  98 - 110 mmol/L Final    CO2 01/17/2025 24  20 - 32 mmol/L Final    Calcium 01/17/2025 9.7  8.6 - 10.2 mg/dL Final    Total Protein 01/17/2025 7.1  6.1 - 8.1 g/dL Final    Albumin 01/17/2025 4.4  3.6 - 5.1 g/dL Final    Globulin, Total 01/17/2025 2.7  1.9 - 3.7 g/dL (calc) Final    Albumin/Globulin Ratio 01/17/2025 1.6  1.0 - 2.5 (calc) Final    Total Bilirubin 01/17/2025 0.9  0.2 - 1.2 mg/dL Final    Alkaline Phosphatase 01/17/2025 64  31 - 125 U/L Final    AST 01/17/2025 20  10 - 35 U/L Final    ALT 01/17/2025 21  6 - 29 U/L Final    Cholesterol 01/17/2025 259 (H)  <200 mg/dL Final    HDL 01/17/2025 92  > OR = 50 mg/dL Final    Triglycerides 01/17/2025 94  <150 mg/dL Final    LDL Cholesterol 01/17/2025 147 (H)  mg/dL (calc) Final    HDL/Cholesterol Ratio 01/17/2025 2.8  <5.0 (calc) Final    Non HDL Chol. (LDL+VLDL) 01/17/2025 167 (H)  <130 mg/dL (calc) Final    TSH w/reflex to FT4 01/17/2025 1.68  mIU/L Final    Color, UA 01/17/2025 YELLOW  YELLOW Final    Appearance, UA 01/17/2025 CLEAR  CLEAR Final    Specific Gravity, UA 01/17/2025 1.017  1.001 - 1.035 Final    pH, UA 01/17/2025 7.0  5.0 - 8.0 Final    Glucose, UA 01/17/2025 NEGATIVE  NEGATIVE Final    Bilirubin, UA 01/17/2025 NEGATIVE  NEGATIVE Final    Ketones, UA 01/17/2025 NEGATIVE  NEGATIVE Final    Occult Blood UA 01/17/2025 TRACE (A)  NEGATIVE Final    Protein, UA 01/17/2025 NEGATIVE  NEGATIVE Final    Nitrite, UA  2025 NEGATIVE  NEGATIVE Final    Leukocytes, UA 2025 TRACE (A)  NEGATIVE Final    WBC Casts, UA 2025 NONE SEEN  < OR = 5 /HPF Final    RBC Casts, UA 2025 NONE SEEN  < OR = 2 /HPF Final    Squam Epithel, UA 2025 6-10 (A)  < OR = 5 /HPF Final    Bacteria, UA 2025 NONE SEEN  NONE SEEN /HPF Final    Hyaline Casts, UA 2025 NONE SEEN  NONE SEEN /LPF Final    Service Cmt: 2025 SEE COMMENT   Final    Reflexive Urine Culture 2025 SEE COMMENT   Final    Urine Culture, Routine 2025 SEE COMMENT (A)   Final       Past Medical History:   Diagnosis Date    Hyperlipidemia      Past Surgical History:   Procedure Laterality Date     SECTION      x3    TONSILLECTOMY  1985     Family History   Problem Relation Name Age of Onset    Hypertension Mother      Heart disease Father Markos Hooker 51        CAD    Cancer Maternal Grandmother          pancreatic CA    Diabetes Maternal Grandfather Alcleah Chastant     Heart disease Paternal Grandmother Adele Hooker     Diabetes Paternal Grandfather Jonathan Hooker        Tests to Keep You Healthy    Mammogram: Met on 2024  Colon Cancer Screening: DUE  Cervical Cancer Screening: Met on 2022      The 10-year CVD risk score (CEFERINO'Agoino, et al., 2008) is: 2.9%    Values used to calculate the score:      Age: 44 years      Sex: Female      Diabetic: No      Tobacco smoker: No      Systolic Blood Pressure: 122 mmHg      Is BP treated: No      HDL Cholesterol: 92 mg/dL      Total Cholesterol: 259 mg/dL     Marital Status:   Alcohol History:  reports current alcohol use of about 6.0 standard drinks of alcohol per week.  Tobacco History:  reports that she has never smoked. She has never been exposed to tobacco smoke. She has never used smokeless tobacco.  Drug History:  has no history on file for drug use.    Health Maintenance Topics with due status: Not Due       Topic Last Completion Date    Cervical Cancer  Screening 06/23/2022    Mammogram 07/29/2024    Lipid Panel 01/17/2025    RSV Vaccine (Age 60+ and Pregnant patients) Not Due     Immunization History   Administered Date(s) Administered    DTaP 07/23/2014    Hepatitis A, Adult 08/25/2017, 03/01/2018    Hepatitis B, Adult 08/04/2017, 09/12/2017, 03/01/2018    Influenza (Flumist) - Quadrivalent - Intranasal *Preferred* (2-49 years old) 12/14/2015    Influenza - Intranasal 11/04/2014    Influenza - Quadrivalent - MDCK - PF 10/27/2018    Influenza - Trivalent - Afluria, Fluzone MDV 10/23/2011, 11/15/2013    PPD Test 04/05/2017, 07/22/2019    Typhoid - ViCPs 08/25/2017       Review of patient's allergies indicates:  No Known Allergies    Current Outpatient Medications:     rosuvastatin (CRESTOR) 20 MG tablet, TAKE 1 TABLET(20 MG) BY MOUTH DAILY, Disp: 90 tablet, Rfl: 1    sertraline (ZOLOFT) 100 MG tablet, Take 1 tablet (100 mg total) by mouth once daily., Disp: 90 tablet, Rfl: 3    valACYclovir (VALTREX) 1000 MG tablet, Take 2 tablets prn at onset of fever blister and repeat 2 tablets 12 hours later, Disp: 30 tablet, Rfl: 1    vitamin D (VITAMIN D3) 1000 units Tab, Take 1,000 Units by mouth once daily., Disp: , Rfl:     Review of Systems   Constitutional:  Negative for activity change, chills, fever and unexpected weight change.   HENT:  Negative for hearing loss, rhinorrhea and trouble swallowing.    Eyes:  Negative for discharge and visual disturbance.   Respiratory:  Negative for cough, chest tightness, shortness of breath and wheezing.    Cardiovascular:  Negative for chest pain and palpitations.   Gastrointestinal:  Negative for abdominal pain, blood in stool, constipation, diarrhea and vomiting.   Endocrine: Negative for polydipsia and polyuria.   Genitourinary:  Negative for difficulty urinating, dysuria, genital sores, hematuria, menstrual problem and vaginal discharge (resolved after diflucan).   Musculoskeletal:  Negative for arthralgias, joint swelling and  "neck pain.   Skin:  Negative for rash.   Neurological:  Negative for dizziness, syncope, weakness and headaches.   Psychiatric/Behavioral:  Negative for confusion, dysphoric mood, self-injury and suicidal ideas. The patient is nervous/anxious.           Objective:      Vitals:    01/29/25 1151   BP: 122/80   Pulse: 76   SpO2: 97%   Weight: 68.9 kg (152 lb)   Height: 5' 3" (1.6 m)     Physical Exam  Vitals reviewed.   Constitutional:       General: She is not in acute distress.     Appearance: Normal appearance. She is well-developed.   HENT:      Head: Normocephalic and atraumatic.      Right Ear: Tympanic membrane and ear canal normal.      Left Ear: Tympanic membrane and ear canal normal.   Neck:      Vascular: No carotid bruit.   Cardiovascular:      Rate and Rhythm: Normal rate and regular rhythm.      Heart sounds: No murmur heard.  Pulmonary:      Effort: Pulmonary effort is normal. No respiratory distress.      Breath sounds: Normal breath sounds. No wheezing or rales.   Abdominal:      General: There is no distension.      Palpations: Abdomen is soft.      Tenderness: There is no abdominal tenderness.   Musculoskeletal:      Cervical back: Neck supple.      Right lower leg: No edema.      Left lower leg: No edema.   Lymphadenopathy:      Cervical: No cervical adenopathy.   Skin:     General: Skin is warm and dry.      Findings: No rash.   Neurological:      General: No focal deficit present.      Mental Status: She is alert and oriented to person, place, and time.      Gait: Gait normal.   Psychiatric:         Mood and Affect: Mood normal.         Speech: Speech normal.         Behavior: Behavior is cooperative.         Thought Content: Thought content normal.           Assessment:       1. Annual physical exam    2. PAPA (generalized anxiety disorder)    3. Mixed hyperlipidemia    4. Fever blister    5. Family history of premature CAD    6. Colon cancer screening           Plan:       1. Annual physical " exam    2. PAPA (generalized anxiety disorder)  -continue sertraline and will refer for therapy  -     Ambulatory referral/consult to Primary Care Behavioral Health (Non-Opioids); Future; Expected date: 02/05/2025    3. Mixed hyperlipidemia  -reviewed recent labs, LDL uncontrolled though patient admits she had stopped taking the statin for a couple months.  She has since restarted since she saw they recent lab results  -     Lipid Panel; Future; Expected date: 01/29/2025  -     Comprehensive Metabolic Panel; Future; Expected date: 01/29/2025    4. Fever blister  -Valtrex p.r.n. use  -     valACYclovir (VALTREX) 1000 MG tablet; Take 2 tablets prn at onset of fever blister and repeat 2 tablets 12 hours later  Dispense: 30 tablet; Refill: 1    5. Family history of premature CAD    6. Colon cancer screening  -discussed importance of colon cancer screening, referral placed  -     Ambulatory referral/consult to Gastroenterology; Future; Expected date: 02/05/2025    7. Vaping nicotine  Assistance with smoking cessation was offered, including:  [x]  Medications  [x]  Counseling  []  Printed Information on Smoking Cessation  [x]  Referral to a Smoking Cessation Program    Patient was counseled regarding smoking for 3-10 minutes.     Follow up in about 6 months (around 7/29/2025) for lipids, anxiety.          Counseled on age and gender appropriate medical preventative services, including cancer screenings, immunizations, overall nutritional health, need for a consistent exercise regimen and an overall push towards maintaining a vigorous and active lifestyle.      1/29/2025 Ann Jovel NP

## 2025-02-24 ENCOUNTER — PATIENT OUTREACH (OUTPATIENT)
Dept: ADMINISTRATIVE | Facility: HOSPITAL | Age: 45
End: 2025-02-24
Payer: OTHER GOVERNMENT

## 2025-04-08 ENCOUNTER — PATIENT MESSAGE (OUTPATIENT)
Dept: PSYCHIATRY | Facility: CLINIC | Age: 45
End: 2025-04-08
Payer: OTHER GOVERNMENT

## 2025-04-25 ENCOUNTER — HOSPITAL ENCOUNTER (EMERGENCY)
Facility: HOSPITAL | Age: 45
Discharge: HOME | End: 2025-04-25
Attending: EMERGENCY MEDICINE
Payer: OTHER GOVERNMENT

## 2025-04-25 VITALS
BODY MASS INDEX: 25.52 KG/M2 | TEMPERATURE: 98 F | OXYGEN SATURATION: 95 % | HEART RATE: 89 BPM | RESPIRATION RATE: 16 BRPM | HEIGHT: 63 IN | WEIGHT: 144 LBS | SYSTOLIC BLOOD PRESSURE: 145 MMHG | DIASTOLIC BLOOD PRESSURE: 84 MMHG

## 2025-04-25 DIAGNOSIS — R10.13 EPIGASTRIC PAIN: ICD-10-CM

## 2025-04-25 DIAGNOSIS — K27.9 PEPTIC ULCER: Primary | ICD-10-CM

## 2025-04-25 DIAGNOSIS — E87.6 HYPOKALEMIA: ICD-10-CM

## 2025-04-25 LAB
ABSOLUTE EOSINOPHIL (SMH): 0.05 K/UL
ABSOLUTE MONOCYTE (SMH): 0.69 K/UL (ref 0.3–1)
ABSOLUTE NEUTROPHIL COUNT (SMH): 6.2 K/UL (ref 1.8–7.7)
ALBUMIN SERPL-MCNC: 4.1 G/DL (ref 3.5–5.2)
ALP SERPL-CCNC: 66 UNIT/L (ref 40–150)
ALT SERPL-CCNC: 32 UNIT/L (ref 10–44)
ANION GAP (SMH): 12 MMOL/L (ref 8–16)
AST SERPL-CCNC: 29 UNIT/L (ref 11–45)
B-HCG UR QL: NEGATIVE
BACTERIA #/AREA URNS AUTO: NORMAL /HPF
BASOPHILS # BLD AUTO: 0.05 K/UL
BASOPHILS NFR BLD AUTO: 0.6 %
BILIRUB SERPL-MCNC: 0.9 MG/DL (ref 0.1–1)
BILIRUB UR QL STRIP.AUTO: NEGATIVE
BUN SERPL-MCNC: 8 MG/DL (ref 6–20)
CALCIUM SERPL-MCNC: 9.3 MG/DL (ref 8.7–10.5)
CHLORIDE SERPL-SCNC: 100 MMOL/L (ref 95–110)
CLARITY UR: CLEAR
CO2 SERPL-SCNC: 25 MMOL/L (ref 23–29)
COLOR UR AUTO: COLORLESS
CREAT SERPL-MCNC: 0.8 MG/DL (ref 0.5–1.4)
CTP QC/QA: YES
ERYTHROCYTE [DISTWIDTH] IN BLOOD BY AUTOMATED COUNT: 13.1 % (ref 11.5–14.5)
GFR SERPLBLD CREATININE-BSD FMLA CKD-EPI: >60 ML/MIN/1.73/M2
GLUCOSE SERPL-MCNC: 102 MG/DL (ref 70–110)
GLUCOSE UR QL STRIP: NEGATIVE
HCT VFR BLD AUTO: 44.2 % (ref 37–48.5)
HCV AB SERPL QL IA: NORMAL
HGB BLD-MCNC: 14.4 GM/DL (ref 12–16)
HGB UR QL STRIP: ABNORMAL
HIV 1+2 AB+HIV1 P24 AG SERPL QL IA: NORMAL
IMM GRANULOCYTES # BLD AUTO: 0.01 K/UL (ref 0–0.04)
IMM GRANULOCYTES NFR BLD AUTO: 0.1 % (ref 0–0.5)
KETONES UR QL STRIP: ABNORMAL
LEUKOCYTE ESTERASE UR QL STRIP: NEGATIVE
LIPASE SERPL-CCNC: 12 U/L (ref 4–60)
LYMPHOCYTES # BLD AUTO: 1.6 K/UL (ref 1–4.8)
MCH RBC QN AUTO: 27.9 PG (ref 27–31)
MCHC RBC AUTO-ENTMCNC: 32.6 G/DL (ref 32–36)
MCV RBC AUTO: 86 FL (ref 82–98)
MICROSCOPIC COMMENT: NORMAL
NITRITE UR QL STRIP: NEGATIVE
NUCLEATED RBC (/100WBC) (SMH): 0 /100 WBC
OHS QRS DURATION: 82 MS
OHS QTC CALCULATION: 459 MS
PH UR STRIP: 7 [PH]
PLATELET # BLD AUTO: 306 K/UL (ref 150–450)
PMV BLD AUTO: 10.3 FL (ref 9.2–12.9)
POTASSIUM SERPL-SCNC: 3 MMOL/L (ref 3.5–5.1)
PROT SERPL-MCNC: 7.6 GM/DL (ref 6–8.4)
PROT UR QL STRIP: NEGATIVE
RBC # BLD AUTO: 5.17 M/UL (ref 4–5.4)
RBC #/AREA URNS AUTO: 1 /HPF
RELATIVE EOSINOPHIL (SMH): 0.6 % (ref 0–8)
RELATIVE LYMPHOCYTE (SMH): 18.6 % (ref 18–48)
RELATIVE MONOCYTE (SMH): 8 % (ref 4–15)
RELATIVE NEUTROPHIL (SMH): 72.1 % (ref 38–73)
SODIUM SERPL-SCNC: 137 MMOL/L (ref 136–145)
SP GR UR STRIP: <1.005
SQUAMOUS #/AREA URNS AUTO: 7 /HPF
TROPONIN I SERPL HS-MCNC: <3 NG/L
UROBILINOGEN UR STRIP-ACNC: NEGATIVE EU/DL
WBC # BLD AUTO: 8.6 K/UL (ref 3.9–12.7)
WBC #/AREA URNS AUTO: 1 /HPF

## 2025-04-25 PROCEDURE — 63600175 PHARM REV CODE 636 W HCPCS: Performed by: NURSE PRACTITIONER

## 2025-04-25 PROCEDURE — 85025 COMPLETE CBC W/AUTO DIFF WBC: CPT | Performed by: NURSE PRACTITIONER

## 2025-04-25 PROCEDURE — 25000003 PHARM REV CODE 250: Performed by: NURSE PRACTITIONER

## 2025-04-25 PROCEDURE — 96361 HYDRATE IV INFUSION ADD-ON: CPT

## 2025-04-25 PROCEDURE — 93010 ELECTROCARDIOGRAM REPORT: CPT | Mod: ,,, | Performed by: GENERAL PRACTICE

## 2025-04-25 PROCEDURE — 84075 ASSAY ALKALINE PHOSPHATASE: CPT | Performed by: NURSE PRACTITIONER

## 2025-04-25 PROCEDURE — 87389 HIV-1 AG W/HIV-1&-2 AB AG IA: CPT | Performed by: EMERGENCY MEDICINE

## 2025-04-25 PROCEDURE — 99285 EMERGENCY DEPT VISIT HI MDM: CPT | Mod: 25

## 2025-04-25 PROCEDURE — 86803 HEPATITIS C AB TEST: CPT | Performed by: EMERGENCY MEDICINE

## 2025-04-25 PROCEDURE — 83690 ASSAY OF LIPASE: CPT | Performed by: NURSE PRACTITIONER

## 2025-04-25 PROCEDURE — 84484 ASSAY OF TROPONIN QUANT: CPT | Performed by: NURSE PRACTITIONER

## 2025-04-25 PROCEDURE — 96375 TX/PRO/DX INJ NEW DRUG ADDON: CPT

## 2025-04-25 PROCEDURE — 81003 URINALYSIS AUTO W/O SCOPE: CPT | Performed by: NURSE PRACTITIONER

## 2025-04-25 PROCEDURE — 93005 ELECTROCARDIOGRAM TRACING: CPT

## 2025-04-25 PROCEDURE — 25500020 PHARM REV CODE 255: Performed by: EMERGENCY MEDICINE

## 2025-04-25 PROCEDURE — 96374 THER/PROPH/DIAG INJ IV PUSH: CPT

## 2025-04-25 PROCEDURE — 81025 URINE PREGNANCY TEST: CPT | Performed by: NURSE PRACTITIONER

## 2025-04-25 RX ORDER — PANTOPRAZOLE SODIUM 40 MG/10ML
40 INJECTION, POWDER, LYOPHILIZED, FOR SOLUTION INTRAVENOUS
Status: COMPLETED | OUTPATIENT
Start: 2025-04-25 | End: 2025-04-25

## 2025-04-25 RX ORDER — ONDANSETRON 4 MG/1
4 TABLET, ORALLY DISINTEGRATING ORAL EVERY 6 HOURS PRN
Qty: 20 TABLET | Refills: 0 | Status: SHIPPED | OUTPATIENT
Start: 2025-04-25

## 2025-04-25 RX ORDER — ONDANSETRON HYDROCHLORIDE 2 MG/ML
4 INJECTION, SOLUTION INTRAVENOUS
Status: COMPLETED | OUTPATIENT
Start: 2025-04-25 | End: 2025-04-25

## 2025-04-25 RX ORDER — LIDOCAINE HYDROCHLORIDE 20 MG/ML
15 SOLUTION OROPHARYNGEAL ONCE
Status: COMPLETED | OUTPATIENT
Start: 2025-04-25 | End: 2025-04-25

## 2025-04-25 RX ORDER — SUCRALFATE 1 G/1
1 TABLET ORAL 4 TIMES DAILY
Qty: 120 TABLET | Refills: 1 | Status: SHIPPED | OUTPATIENT
Start: 2025-04-25 | End: 2025-05-25

## 2025-04-25 RX ORDER — ALUMINUM HYDROXIDE, MAGNESIUM HYDROXIDE, AND SIMETHICONE 1200; 120; 1200 MG/30ML; MG/30ML; MG/30ML
30 SUSPENSION ORAL ONCE
Status: COMPLETED | OUTPATIENT
Start: 2025-04-25 | End: 2025-04-25

## 2025-04-25 RX ORDER — PANTOPRAZOLE SODIUM 40 MG/1
40 TABLET, DELAYED RELEASE ORAL DAILY
Qty: 30 TABLET | Refills: 1 | Status: SHIPPED | OUTPATIENT
Start: 2025-04-25 | End: 2026-04-25

## 2025-04-25 RX ORDER — POTASSIUM CHLORIDE 20 MEQ/1
40 TABLET, EXTENDED RELEASE ORAL
Status: COMPLETED | OUTPATIENT
Start: 2025-04-25 | End: 2025-04-25

## 2025-04-25 RX ADMIN — ONDANSETRON 4 MG: 2 INJECTION INTRAMUSCULAR; INTRAVENOUS at 10:04

## 2025-04-25 RX ADMIN — LIDOCAINE HYDROCHLORIDE 15 ML: 20 SOLUTION ORAL at 12:04

## 2025-04-25 RX ADMIN — POTASSIUM CHLORIDE 40 MEQ: 1500 TABLET, EXTENDED RELEASE ORAL at 12:04

## 2025-04-25 RX ADMIN — PANTOPRAZOLE SODIUM 40 MG: 40 INJECTION, POWDER, FOR SOLUTION INTRAVENOUS at 12:04

## 2025-04-25 RX ADMIN — ALUMINUM HYDROXIDE, MAGNESIUM HYDROXIDE, AND SIMETHICONE 30 ML: 1200; 120; 1200 SUSPENSION ORAL at 12:04

## 2025-04-25 RX ADMIN — SODIUM CHLORIDE 1000 ML: 9 INJECTION, SOLUTION INTRAVENOUS at 10:04

## 2025-04-25 RX ADMIN — IOHEXOL 75 ML: 350 INJECTION, SOLUTION INTRAVENOUS at 11:04

## 2025-04-25 NOTE — ED PROVIDER NOTES
Encounter Date: 2025       History     Chief Complaint   Patient presents with    Abdominal Pain    Nausea    Vomiting     Patient states she is vomiting nausea midsternal abdominal pain      Piedad Mcmahon is a 44 year old female with pmh hyperlipidemia presenting to the ED with c/o epigastric pain and vomiting for the past four days. She denies chest pain, SOB, diarrhea, fever. No known sick contacts. She has been unable to tolerate PO intake even after taking zofran.       Review of patient's allergies indicates:  No Known Allergies  Past Medical History:   Diagnosis Date    Hyperlipidemia      Past Surgical History:   Procedure Laterality Date     SECTION      x3    TONSILLECTOMY  1985     Family History   Problem Relation Name Age of Onset    Hypertension Mother      Heart disease Father Markos Hooker 51        CAD    Cancer Maternal Grandmother          pancreatic CA    Diabetes Maternal Grandfather Harley Graystant     Heart disease Paternal Grandmother Adele Hooker     Diabetes Paternal Grandfather Jonathan Hooker      Social History[1]  Review of Systems   Constitutional:  Negative for fever.   HENT:  Negative for sore throat.    Respiratory:  Negative for shortness of breath.    Cardiovascular:  Negative for chest pain.   Gastrointestinal:  Positive for abdominal pain (epigastric), nausea and vomiting. Negative for diarrhea.   Genitourinary:  Negative for dysuria, frequency and hematuria.   Musculoskeletal:  Negative for back pain.   Skin:  Negative for rash.   Neurological:  Negative for weakness.   Hematological:  Does not bruise/bleed easily.       Physical Exam     Initial Vitals [25 0926]   BP Pulse Resp Temp SpO2   137/81 108 19 98.3 °F (36.8 °C) 99 %      MAP       --         Physical Exam    Nursing note and vitals reviewed.  Constitutional: She appears well-developed and well-nourished. She is not diaphoretic. No distress.   HENT:   Head: Normocephalic and atraumatic.  Mouth/Throat: Oropharynx is clear and moist.   Eyes: Conjunctivae are normal.   Neck: Neck supple.   Cardiovascular:  Normal rate, regular rhythm, normal heart sounds and intact distal pulses.     Exam reveals no gallop and no friction rub.       No murmur heard.  Pulmonary/Chest: Breath sounds normal. No respiratory distress. She has no wheezes. She has no rhonchi. She has no rales.   Abdominal: Abdomen is soft. She exhibits no distension. There is abdominal tenderness (epigastric).   Musculoskeletal:         General: Normal range of motion.      Cervical back: Neck supple.     Neurological: She is alert and oriented to person, place, and time.   Skin: No rash noted. No erythema.   Psychiatric: Her speech is normal.         ED Course   Procedures  Labs Reviewed   COMPREHENSIVE METABOLIC PANEL - Abnormal       Result Value    Sodium 137      Potassium 3.0 (*)     Chloride 100      CO2 25      Glucose 102      BUN 8      Creatinine 0.8      Calcium 9.3      Protein Total 7.6      Albumin 4.1      Bilirubin Total 0.9      ALP 66      AST 29      ALT 32      Anion Gap 12      eGFR >60     URINALYSIS, REFLEX TO URINE CULTURE - Abnormal    Color, UA Colorless (*)     Appearance, UA Clear      Spec Grav UA <1.005 (*)     pH, UA 7.0      Protein, UA Negative      Glucose, UA Negative      Ketones, UA Trace (*)     Blood, UA 1+ (*)     Bilirubin, UA Negative      Urobilinogen, UA Negative      Nitrites, UA Negative      Leukocyte Esterase, UA Negative     HEPATITIS C ANTIBODY - Normal    Hep C Ab Interp Non-Reactive     HIV 1 / 2 ANTIBODY - Normal    HIV 1/2 Ag/Ab Non-Reactive     LIPASE - Normal    Lipase Level 12     CBC WITH DIFFERENTIAL - Normal    WBC 8.60      RBC 5.17      Hgb 14.4      Hct 44.2      MCV 86      MCH 27.9      MCHC 32.6      RDW 13.1      Platelet Count 306      MPV 10.3      Nucleated RBC 0      Neut % 72.1      Lymph % 18.6      Mono % 8.0      Eos % 0.6      Basophil % 0.6      Imm Grans % 0.1       Neut # 6.2      Lymph # 1.60      Mono # 0.69      Eos # 0.05      Baso # 0.05      Imm Grans # 0.01     TROPONIN I HIGH SENSITIVITY - Normal    Troponin High Sensitive <3     CBC W/ AUTO DIFFERENTIAL    Narrative:     The following orders were created for panel order CBC auto differential.  Procedure                               Abnormality         Status                     ---------                               -----------         ------                     CBC with Differential[[id number]]       Normal              Final result                 Please view results for these tests on the individual orders.   URINALYSIS MICROSCOPIC    RBC, UA 1      WBC, UA 1      Bacteria, UA Rare      Squamous Epithelial Cells, UA 7      Microscopic Comment       POCT URINE PREGNANCY    POC Preg Test, Ur Negative       Acceptable Yes            Imaging Results              CT Abdomen Pelvis With IV Contrast NO Oral Contrast (Final result)  Result time 04/25/25 11:53:51      Final result by Herminio Locke Jr., MD (04/25/25 11:53:51)                   Impression:      4 mm left intrarenal stone without hydronephrosis.  6 mm hypodense lesion of the left lobe of the liver.  Ultrasound evaluation is recommended to determine if this is cystic or solid.  Few diverticuli noted in the sigmoid colon.  The rest of the CT abdomen and pelvis is negative      Electronically signed by: Herminio Locke MD  Date:    04/25/2025  Time:    11:53               Narrative:    EXAMINATION:  CT ABDOMEN PELVIS WITH IV CONTRAST    CLINICAL HISTORY:  Epigastric pain;    TECHNIQUE:  Low dose axial images, sagittal and coronal reformations were obtained from the lung bases to the pubic symphysis following the IV administration of 75 mL of Omnipaque 350    COMPARISON:  None.    FINDINGS:  The liver is of normal size contour and CT density.  Identified in the left lobe however is a single hypodense 6 mm mass.  This could represent a cyst  but it does not measure water density on this postcontrast study.  Ultrasound evaluation is recommended.  Other focal liver masses are not seen.  The gallbladder is of normal size without CT evidence of stone.  The common bile duct is not dilated.  The pancreas is of normal contour and CT density without edema or mass.  The spleen is of normal size and CT density.    The adrenal glands are not enlarged.  The kidneys are of normal size contour and contrast enhancement.  There is a 4 mm left intrarenal stone in the lower pole calyx without hydronephrosis on either side.  The ureters follow a normal course down to the bladder without dilation or distal stones.  The abdominal aorta and inferior vena cava are of normal caliber.    The stomach is of normal configuration.  Small bowel dilatation or air-fluid levels are not seen.  There are few diverticuli scattered in the sigmoid colon without CT evidence of diverticulitis.  A mass or distention of the colon is not seen.  A normal appendix is noted.  Free fluid or free air is not seen.    The bladder is of normal contour without mass or asymmetry.  The uterus and ovaries appear of normal size and contour.  Free fluid in the cul de sac is not seen.                                       Medications   sodium chloride 0.9% bolus 1,000 mL 1,000 mL (1,000 mLs Intravenous New Bag 4/25/25 1009)   ondansetron injection 4 mg (4 mg Intravenous Given 4/25/25 1009)     Medical Decision Making  This is an urgent evaluation of a 44 year old female presenting to the ED with c/o epigastric pain, nausea, and vomiting. She has epigastric tenderness to palpation of the abdomen. Labs ordered and reviewed with results as follows:   Sodium 137   Potassium 3.0 (*)  Chloride 100   CO2 25   Glucose 102   BUN 8   Creatinine 0.8   Calcium 9.3   Protein Total 7.6   Albumin 4.1   Bilirubin Total 0.9   ALP 66   AST 29   ALT 32   Anion Gap 12   eGFR >60   URINALYSIS, REFLEX TO URINE CULTURE -  Abnormal  Color, UA Colorless (*)  Appearance, UA Clear   Spec Grav UA <1.005 (*)  pH, UA 7.0   Protein, UA Negative   Glucose, UA Negative   Ketones, UA Trace (*)  Blood, UA 1+ (*)  Bilirubin, UA Negative   Urobilinogen, UA Negative   Nitrites, UA Negative   Leukocyte Esterase, UA Negative   HEPATITIS C ANTIBODY - Normal  Hep C Ab Interp Non-Reactive   HIV 1 / 2 ANTIBODY - Normal  HIV 1/2 Ag/Ab Non-Reactive   LIPASE - Normal  Lipase Level 12   CBC WITH DIFFERENTIAL - Normal  WBC 8.60   RBC 5.17   Hgb 14.4   Hct 44.2     CT ordered and reviewed with radiology report as follows:   4 mm left intrarenal stone without hydronephrosis.  6 mm hypodense lesion of the left lobe of the liver.  Ultrasound evaluation is recommended to determine if this is cystic or solid.  Few diverticuli noted in the sigmoid colon.  The rest of the CT abdomen and pelvis is negative     No evidence of cholecystitis, choledocholithiasis, pancreatitis, SBO, perforation. She was given GI cocktail and reported significant improvement of symptoms. Potassium also replaced. Troponin WNL and EKG shows normal sinus rhythm with no ST elevation. Symptoms most likely related to peptic ulcer so she will be discharged with protonix and carafate. She has previously been referred to GI so instructed that she needs to follow up in order to have colonoscopy as previously advised as well as follow up on liver lesion. She was tolerating PO intake without difficulty at time of discharge. Strict ED return precautions discussed and she verbalized understanding. Based on my clinical evaluation, I do not appreciate any immediate, emergent, or life threatening condition or etiology that warrants additional workup today and feel that the patient can be discharged with close follow up care.         Amount and/or Complexity of Data Reviewed  Labs: ordered. Decision-making details documented in ED Course.  Radiology: ordered. Decision-making details documented in ED  Course.    Risk  OTC drugs.  Prescription drug management.                                      Clinical Impression:  Final diagnoses:  [R10.13] Epigastric pain  [K27.9] Peptic ulcer (Primary)  [E87.6] Hypokalemia                     [1]   Social History  Tobacco Use    Smoking status: Never     Passive exposure: Never    Smokeless tobacco: Never   Substance Use Topics    Alcohol use: Yes     Alcohol/week: 6.0 standard drinks of alcohol     Types: 3 Glasses of wine, 3 Cans of beer per week        Denisse Marte NP  04/25/25 2640

## 2025-04-28 ENCOUNTER — TELEPHONE (OUTPATIENT)
Dept: FAMILY MEDICINE | Facility: CLINIC | Age: 45
End: 2025-04-28
Payer: OTHER GOVERNMENT

## 2025-04-28 DIAGNOSIS — K21.9 GASTROESOPHAGEAL REFLUX DISEASE, UNSPECIFIED WHETHER ESOPHAGITIS PRESENT: Primary | ICD-10-CM

## 2025-04-28 NOTE — TELEPHONE ENCOUNTER
----- Message from Sheila sent at 4/28/2025 10:48 AM CDT -----  Patient went to the ER ([facility])and is needing a sooner appointment than the 22nd  to be seen. [phone]

## 2025-04-28 NOTE — TELEPHONE ENCOUNTER
Ann had referred for colonscopy screen, but never heard, now will need new for EGD following this er visit  Sugar

## 2025-05-19 DIAGNOSIS — K76.89 HEPATIC CYST: ICD-10-CM

## 2025-05-19 DIAGNOSIS — Z12.11 SCREENING FOR COLON CANCER: Primary | ICD-10-CM

## 2025-05-20 ENCOUNTER — HOSPITAL ENCOUNTER (OUTPATIENT)
Dept: RADIOLOGY | Facility: HOSPITAL | Age: 45
Discharge: HOME | End: 2025-05-20
Attending: INTERNAL MEDICINE
Payer: OTHER GOVERNMENT

## 2025-05-20 DIAGNOSIS — K76.89 HEPATIC CYST: ICD-10-CM

## 2025-05-20 DIAGNOSIS — Z12.11 SCREENING FOR COLON CANCER: ICD-10-CM

## 2025-05-20 PROCEDURE — 76705 ECHO EXAM OF ABDOMEN: CPT | Mod: 26,,, | Performed by: RADIOLOGY

## 2025-05-20 PROCEDURE — 76705 ECHO EXAM OF ABDOMEN: CPT | Mod: TC,PO

## 2025-05-29 ENCOUNTER — TELEPHONE (OUTPATIENT)
Dept: FAMILY MEDICINE | Facility: CLINIC | Age: 45
End: 2025-05-29
Payer: OTHER GOVERNMENT

## 2025-05-29 NOTE — TELEPHONE ENCOUNTER
----- Message from Ann sent at 5/29/2025  2:12 PM CDT -----  Contact: [facility]  [facility] needs [facility]  auth for colonoscopy and gerd, K21.9 and Z12.11. [facility] #[phone]

## 2025-06-05 ENCOUNTER — TELEPHONE (OUTPATIENT)
Dept: FAMILY MEDICINE | Facility: CLINIC | Age: 45
End: 2025-06-05
Payer: OTHER GOVERNMENT

## 2025-06-05 DIAGNOSIS — Z79.899 ENCOUNTER FOR LONG-TERM (CURRENT) USE OF MEDICATIONS: Primary | ICD-10-CM

## 2025-06-05 DIAGNOSIS — E78.2 MIXED HYPERLIPIDEMIA: ICD-10-CM

## 2025-06-06 DIAGNOSIS — K76.89 HEPATIC CYST: ICD-10-CM

## 2025-06-06 DIAGNOSIS — R93.3 ABNORMAL CT SCAN, GASTROINTESTINAL TRACT: Primary | ICD-10-CM

## 2025-06-12 ENCOUNTER — HOSPITAL ENCOUNTER (OUTPATIENT)
Dept: RADIOLOGY | Facility: HOSPITAL | Age: 45
Discharge: HOME | End: 2025-06-12
Attending: INTERNAL MEDICINE
Payer: OTHER GOVERNMENT

## 2025-06-12 DIAGNOSIS — R93.3 ABNORMAL CT SCAN, GASTROINTESTINAL TRACT: ICD-10-CM

## 2025-06-12 DIAGNOSIS — K76.89 HEPATIC CYST: ICD-10-CM

## 2025-06-12 PROCEDURE — A9585 GADOBUTROL INJECTION: HCPCS

## 2025-06-12 PROCEDURE — 74183 MRI ABD W/O CNTR FLWD CNTR: CPT | Mod: TC

## 2025-06-12 PROCEDURE — 74183 MRI ABD W/O CNTR FLWD CNTR: CPT | Mod: 26,,, | Performed by: RADIOLOGY

## 2025-06-12 PROCEDURE — 25500020 PHARM REV CODE 255

## 2025-06-12 RX ORDER — GADOBUTROL 604.72 MG/ML
INJECTION INTRAVENOUS
Status: COMPLETED
Start: 2025-06-12 | End: 2025-06-12

## 2025-06-12 RX ADMIN — GADOBUTROL 6 ML: 604.72 INJECTION INTRAVENOUS at 06:06

## 2025-06-17 ENCOUNTER — OFFICE VISIT (OUTPATIENT)
Dept: FAMILY MEDICINE | Facility: CLINIC | Age: 45
End: 2025-06-17
Payer: OTHER GOVERNMENT

## 2025-06-17 VITALS
SYSTOLIC BLOOD PRESSURE: 138 MMHG | WEIGHT: 154.63 LBS | OXYGEN SATURATION: 98 % | BODY MASS INDEX: 27.4 KG/M2 | HEIGHT: 63 IN | DIASTOLIC BLOOD PRESSURE: 80 MMHG | HEART RATE: 70 BPM

## 2025-06-17 DIAGNOSIS — K76.89 LIVER CYST: ICD-10-CM

## 2025-06-17 DIAGNOSIS — Z12.31 ENCOUNTER FOR SCREENING MAMMOGRAM FOR BREAST CANCER: ICD-10-CM

## 2025-06-17 DIAGNOSIS — E78.2 MIXED HYPERLIPIDEMIA: Primary | ICD-10-CM

## 2025-06-17 DIAGNOSIS — F41.1 GAD (GENERALIZED ANXIETY DISORDER): ICD-10-CM

## 2025-06-17 PROCEDURE — 99214 OFFICE O/P EST MOD 30 MIN: CPT | Mod: S$GLB,,, | Performed by: NURSE PRACTITIONER

## 2025-06-17 RX ORDER — SERTRALINE HYDROCHLORIDE 100 MG/1
100 TABLET, FILM COATED ORAL DAILY
Qty: 90 TABLET | Refills: 3 | Status: SHIPPED | OUTPATIENT
Start: 2025-06-17 | End: 2026-06-17

## 2025-06-17 RX ORDER — ROSUVASTATIN CALCIUM 20 MG/1
20 TABLET, COATED ORAL DAILY
Qty: 90 TABLET | Refills: 3 | Status: SHIPPED | OUTPATIENT
Start: 2025-06-17 | End: 2025-06-19 | Stop reason: SDUPTHER

## 2025-06-17 NOTE — PROGRESS NOTES
SUBJECTIVE:    Patient ID: Piedad Mcmahon is a 44 y.o. female.    Chief Complaint: Follow-up (No bottles//Pt is here for a check up and possible medication refills//mammo ordered today//Pt is scheduled for colo with Dr. Wooten, next week//[place] )      History of Present Illness    Patient presents today for regular follow-up of dyslipidemia and generalized anxiety disorder.    Patient reports overall she has been doing quite well recently.    She was seen in ER in April with epigastric pain.  Had CT scan of abdomen and pelvis which showed single hypodense 6 mm mass liver, gallbladder and common bile duct were normal.  She was started on PPI and carafate from the ER and referred to GI.  She then went and saw Dr. Wooten and had ultrasound of the abdomen which showed subcentimeter hypoechoic nodular area in the liver in 8 mm thinly septated hepatic cyst.  MRI of the abdomen was then ordered which she just had last week.  MRI shows 5 mm T2 hyperintense lesion within the left lobe of the liver correlating with CT lesion and appears to be a cyst.  Patient reports since starting PPI the epigastric pain has now resolved.  She is having regular bowel movements.  She is scheduled for colonoscopy with Dr. Wooten next week    She reports mood has been well controlled with sertraline    Just had lipids drawn this morning for follow-up.  Reports has been compliant with her rosuvastatin daily    She reports normal menstrual cycles       ROS:  General: no fever, no chills, no fatigue, no weight gain, no weight loss  Eyes: no vision changes, no redness, no discharge  ENT: no ear pain, no nasal congestion, no sore throat  Cardiovascular: no chest pain, no palpitations, no lower extremity edema  Respiratory: no cough, no shortness of breath  Gastrointestinal: no abdominal pain, no nausea, no vomiting, no diarrhea, no constipation, no blood in stool  Genitourinary: no dysuria, no hematuria, no frequency  Musculoskeletal: no joint  pain, no muscle pain  Skin: no rash, no lesion  Neurological: no headache, no dizziness, no numbness, no tingling  Psychiatric: complains of anxiety-stable on med, no depression, no sleep difficulty          Answers submitted by the patient for this visit:  Review of Systems Questionnaire (Submitted on 6/16/2025)  activity change: No  unexpected weight change: No  neck pain: No  hearing loss: No  rhinorrhea: No  trouble swallowing: No  eye discharge: No  visual disturbance: No  chest tightness: No  wheezing: No  chest pain: No  palpitations: No  blood in stool: No  constipation: No  vomiting: No  diarrhea: No  polydipsia: No  polyuria: No  difficulty urinating: No  hematuria: No  menstrual problem: No  dysuria: No  joint swelling: No  arthralgias: No  headaches: No  weakness: No  confusion: No  dysphoric mood: No      Admission on 04/25/2025, Discharged on 04/25/2025   Component Date Value Ref Range Status    Hep C Ab Interp 04/25/2025 Non-Reactive  Non-Reactive Final    HIV 1/2 Ag/Ab 04/25/2025 Non-Reactive  Non-Reactive Final    Sodium 04/25/2025 137  136 - 145 mmol/L Final    Potassium 04/25/2025 3.0 (L)  3.5 - 5.1 mmol/L Final    Chloride 04/25/2025 100  95 - 110 mmol/L Final    CO2 04/25/2025 25  23 - 29 mmol/L Final    Glucose 04/25/2025 102  70 - 110 mg/dL Final    BUN 04/25/2025 8  6 - 20 mg/dL Final    Creatinine 04/25/2025 0.8  0.5 - 1.4 mg/dL Final    Calcium 04/25/2025 9.3  8.7 - 10.5 mg/dL Final    Protein Total 04/25/2025 7.6  6.0 - 8.4 gm/dL Final    Albumin 04/25/2025 4.1  3.5 - 5.2 g/dL Final    Bilirubin Total 04/25/2025 0.9  0.1 - 1.0 mg/dL Final    ALP 04/25/2025 66  40 - 150 unit/L Final    AST 04/25/2025 29  11 - 45 unit/L Final    ALT 04/25/2025 32  10 - 44 unit/L Final    Anion Gap 04/25/2025 12  8 - 16 mmol/L Final    eGFR 04/25/2025 >60  >60 mL/min/1.73/m2 Final    Lipase Level 04/25/2025 12  4 - 60 U/L Final    Color, UA 04/25/2025 Colorless (A)  Yellow, Straw, Zeenat Final    Appearance,  UA 04/25/2025 Clear  Clear Final    Spec Grav UA 04/25/2025 <1.005 (A)  1.005 - 1.030 Final    pH, UA 04/25/2025 7.0  5.0 - 8.0 Final    Protein, UA 04/25/2025 Negative  Negative Final    Glucose, UA 04/25/2025 Negative  Negative Final    Ketones, UA 04/25/2025 Trace (A)  Negative Final    Blood, UA 04/25/2025 1+ (A)  Negative Final    Bilirubin, UA 04/25/2025 Negative  Negative Final    Urobilinogen, UA 04/25/2025 Negative  <2.0 EU/dL Final    Nitrites, UA 04/25/2025 Negative  Negative Final    Leukocyte Esterase, UA 04/25/2025 Negative  Negative Final    WBC 04/25/2025 8.60  3.90 - 12.70 K/uL Final    RBC 04/25/2025 5.17  4.00 - 5.40 M/uL Final    Hgb 04/25/2025 14.4  12.0 - 16.0 gm/dL Final    Hct 04/25/2025 44.2  37.0 - 48.5 % Final    MCV 04/25/2025 86  82 - 98 fL Final    MCH 04/25/2025 27.9  27.0 - 31.0 pg Final    MCHC 04/25/2025 32.6  32.0 - 36.0 g/dL Final    RDW 04/25/2025 13.1  11.5 - 14.5 % Final    Platelet Count 04/25/2025 306  150 - 450 K/uL Final    MPV 04/25/2025 10.3  9.2 - 12.9 fL Final    Nucleated RBC 04/25/2025 0  <=0 /100 WBC Final    Neut % 04/25/2025 72.1  38 - 73 % Final    Lymph % 04/25/2025 18.6  18 - 48 % Final    Mono % 04/25/2025 8.0  4 - 15 % Final    Eos % 04/25/2025 0.6  0 - 8 % Final    Basophil % 04/25/2025 0.6  <=1.9 % Final    Imm Grans % 04/25/2025 0.1  0.0 - 0.5 % Final    Neut # 04/25/2025 6.2  1.8 - 7.7 K/uL Final    Lymph # 04/25/2025 1.60  1 - 4.8 K/uL Final    Mono # 04/25/2025 0.69  0.3 - 1 K/uL Final    Eos # 04/25/2025 0.05  <=0.5 K/uL Final    Baso # 04/25/2025 0.05  <=0.2 K/uL Final    Imm Grans # 04/25/2025 0.01  0.00 - 0.04 K/uL Final    QRS Duration 04/25/2025 82  ms Final    [facility] QTC Calculation 04/25/2025 459  ms Final    POC Preg Test, Ur 04/25/2025 Negative  Negative Final     Acceptable 04/25/2025 Yes   Final    Troponin High Sensitive 04/25/2025 <3  <=14 ng/L Final    RBC, UA 04/25/2025 1  <=4 /HPF Final    WBC, UA 04/25/2025 1  <=5 /HPF Final     Bacteria, UA 2025 Rare  None, Rare, Occasional /HPF Final    Squamous Epithelial Cells, UA 2025 7  /HPF Final    Microscopic Comment 2025    Final       Past Medical History:   Diagnosis Date    Hyperlipidemia      Past Surgical History:   Procedure Laterality Date     SECTION      x3    TONSILLECTOMY  1985     Family History   Problem Relation Name Age of Onset    Hypertension Mother      Heart disease Father Markos Hooker 51        CAD    Cancer Maternal Grandmother          pancreatic CA    Diabetes Maternal Grandfather Alcleah Graystant     Heart disease Paternal Grandmother Adele Hooker     Diabetes Paternal Grandfather Jonathan Hooker        Tests to Keep You Healthy    Mammogram: Met on 2024  Colon Cancer Screening: DUE  Cervical Cancer Screening: Met on 2022      The 10-year CVD risk score (Deonte, et al., 2008) is: 4.1%    Values used to calculate the score:      Age: 44 years      Sex: Female      Diabetic: No      Tobacco smoker: No      Systolic Blood Pressure: 138 mmHg      Is BP treated: No      HDL Cholesterol: 92 mg/dL      Total Cholesterol: 259 mg/dL     Marital Status:   Alcohol History:  reports current alcohol use of about 6.0 standard drinks of alcohol per week.  Tobacco History:  reports that she has never smoked. She has never been exposed to tobacco smoke. She has never used smokeless tobacco.  Drug History:  has no history on file for drug use.    Health Maintenance Topics with due status: Not Due       Topic Last Completion Date    Cervical Cancer Screening 2022    Lipid Panel 2025    RSV Vaccine (Age 60+ and Pregnant patients) Not Due     Immunization History   Administered Date(s) Administered    DTaP 2014    Hepatitis A, Adult 2017, 2018    Hepatitis B, Adult 2017, 2017, 2018    Influenza (Flumist) - Quadrivalent - Intranasal *Preferred* (2-49 years old) 2015    Influenza - Intranasal  "11/04/2014    Influenza - Quadrivalent - MDCK - PF 10/27/2018    Influenza - Trivalent - Afluria, Fluzone MDV 10/23/2011, 11/15/2013    PPD Test 04/05/2017, 07/22/2019    Typhoid - ViCPs 08/25/2017       Review of patient's allergies indicates:  No Known Allergies  Current Medications[1]        Objective:      Vitals:    06/17/25 0953   BP: 138/80   Pulse: 70   SpO2: 98%   Weight: 70.1 kg (154 lb 9.6 oz)   Height: 5' 3" (1.6 m)       Physical Exam  Vitals reviewed.   Constitutional:       General: She is not in acute distress.     Appearance: Normal appearance. She is well-developed.   HENT:      Head: Normocephalic and atraumatic.      Right Ear: Tympanic membrane and ear canal normal.      Left Ear: Tympanic membrane and ear canal normal.   Neck:      Vascular: No carotid bruit.   Cardiovascular:      Rate and Rhythm: Normal rate and regular rhythm.      Heart sounds: No murmur heard.  Pulmonary:      Effort: Pulmonary effort is normal. No respiratory distress.      Breath sounds: Normal breath sounds. No wheezing or rales.   Abdominal:      General: There is no distension.      Palpations: Abdomen is soft.      Tenderness: There is no abdominal tenderness.   Musculoskeletal:      Cervical back: Neck supple.      Right lower leg: No edema.      Left lower leg: No edema.   Lymphadenopathy:      Cervical: No cervical adenopathy.   Skin:     General: Skin is warm and dry.      Findings: No rash.   Neurological:      General: No focal deficit present.      Mental Status: She is alert and oriented to person, place, and time.      Gait: Gait normal.   Psychiatric:         Mood and Affect: Mood normal.         Speech: Speech normal.         Behavior: Behavior is cooperative.         Thought Content: Thought content normal.          Assessment:       1. Mixed hyperlipidemia    2. PAPA (generalized anxiety disorder)    3. Encounter for screening mammogram for breast cancer           Assessment & Plan    MIXED " HYPERLIPIDEMIA:  - labs drawn today  - Continue rosuvastatin for cholesterol management.    GENERALIZED ANXIETY DISORDER:  - Continue sertraline (Zoloft) as it is improving anxiety symptoms.  - Patient reports improved mood with 's positive feedback confirming this improvement.    BENIGN LIVER CYST:  - MRI and ultrasound confirmed normal findings and presence of benign hepatic cyst.  - Lab results showed normal liver function.    FOLLOW-UP:  - Follow up in 1 year if all test results are normal.  - Contact office if any changes or concerns arise before next appointment.        Plan:       1. Mixed hyperlipidemia  -     rosuvastatin (CRESTOR) 20 MG tablet; Take 1 tablet (20 mg total) by mouth once daily.  Dispense: 90 tablet; Refill: 3    2. PAPA (generalized anxiety disorder)  -     sertraline (ZOLOFT) 100 MG tablet; Take 1 tablet (100 mg total) by mouth once daily.  Dispense: 90 tablet; Refill: 3    3. Encounter for screening mammogram for breast cancer  -     Mammo Digital Screening Bilat; Future; Expected date: 07/29/2025      Follow up in about 1 year (around 6/17/2026) for annual visit.          Counseled on age and gender appropriate medical preventative services, including cancer screenings, immunizations, overall nutritional health, need for a consistent exercise regimen and an overall push towards maintaining a vigorous and active lifestyle.      This note was generated with the assistance of ambient listening technology. Verbal consent was obtained by the patient and accompanying visitor(s) for the recording of patient appointment to facilitate this note. I attest to having reviewed and edited the generated note for accuracy, though some syntax or spelling errors may persist. Please contact the author of this note for any clarification.       6/17/2025 Ann Jovel NP           [1]   Current Outpatient Medications:     pantoprazole (PROTONIX) 40 MG tablet, Take 1 tablet (40 mg total) by mouth  once daily., Disp: 30 tablet, Rfl: 1    ondansetron (ZOFRAN-ODT) 4 MG TbDL, Take 1 tablet (4 mg total) by mouth every 6 (six) hours as needed (nausea)., Disp: 20 tablet, Rfl: 0    rosuvastatin (CRESTOR) 20 MG tablet, Take 1 tablet (20 mg total) by mouth once daily., Disp: 90 tablet, Rfl: 3    sertraline (ZOLOFT) 100 MG tablet, Take 1 tablet (100 mg total) by mouth once daily., Disp: 90 tablet, Rfl: 3    valACYclovir (VALTREX) 1000 MG tablet, Take 2 tablets prn at onset of fever blister and repeat 2 tablets 12 hours later, Disp: 30 tablet, Rfl: 1    vitamin D (VITAMIN D3) 1000 units Tab, Take 1,000 Units by mouth once daily., Disp: , Rfl:

## 2025-06-18 ENCOUNTER — RESULTS FOLLOW-UP (OUTPATIENT)
Dept: FAMILY MEDICINE | Facility: CLINIC | Age: 45
End: 2025-06-18
Payer: OTHER GOVERNMENT

## 2025-06-18 DIAGNOSIS — E78.2 MIXED HYPERLIPIDEMIA: ICD-10-CM

## 2025-06-19 RX ORDER — ROSUVASTATIN CALCIUM 40 MG/1
40 TABLET, COATED ORAL DAILY
Qty: 90 TABLET | Refills: 2 | Status: SHIPPED | OUTPATIENT
Start: 2025-06-19

## 2025-06-24 LAB — CRC RECOMMENDATION EXT: NORMAL

## 2025-07-17 ENCOUNTER — PATIENT OUTREACH (OUTPATIENT)
Dept: ADMINISTRATIVE | Facility: HOSPITAL | Age: 45
End: 2025-07-17
Payer: OTHER GOVERNMENT

## 2025-07-17 NOTE — PROGRESS NOTES
External colonoscopy/pathology report received, uploaded to chart and hyper-linked in    DISPLAY PLAN FREE TEXT Alert and oriented to person, place and time

## 2025-07-24 ENCOUNTER — E-VISIT (OUTPATIENT)
Dept: URGENT CARE | Facility: CLINIC | Age: 45
End: 2025-07-24
Payer: OTHER GOVERNMENT

## 2025-07-24 DIAGNOSIS — N76.0 ACUTE VAGINITIS: Primary | ICD-10-CM

## 2025-07-24 RX ORDER — FLUCONAZOLE 150 MG/1
150 TABLET ORAL ONCE
Qty: 1 TABLET | Refills: 1 | Status: SHIPPED | OUTPATIENT
Start: 2025-07-24 | End: 2025-07-24

## 2025-07-24 NOTE — PROGRESS NOTES
Patient ID: Piedad Mcmahon is a 44 y.o. female.        E-Visit Time Tracking:   Day 1 Time (in minutes): 5  Total Time (in minutes): 5      Chief Complaint: General Illness (Entered automatically based on patient selection in [employer].)      The patient initiated a request through [employer] on 7/24/2025 for evaluation and management with a chief complaint of General Illness (Entered automatically based on patient selection in [employer].)     I evaluated the questionnaire submission on 07/24/2025.    [facility]    7/24/2025  1:04 PM CDT - Filed by Patient   What do you need help with? Vaginal Symptoms   Do you agree to participate in an E-Visit? Yes   If you have any of the following symptoms,  please do not complete an E-Visit,  schedule an appointment with your provider: I acknowledge   Do you have any of the following pregnancy-related conditions? (Pregnant, Possibly pregnant, Breast feeding, None) None   What is the main issue you would like addressed today? Yeast infection   Which of the following vaginal concerns do you have? Discharge;  Itching   Do you have vaginal discharge? White/milky discharge   Do you have pain while passing urine? No   Do you have any of the following symptoms? None of the above    Have you taken antibiotics in the last two weeks? No    Do you use any of the following? Bubble baths   Which of the following applies to your menstrual period? Had in the last 2 weeks   Which of the following applies to your menstrual cycle? Normal amount of bleeding   Do you have spotting between periods? No   Do you have pain with your period? No   Have you had similar symptoms in the past? Only once   When you had similar symptoms in the past, did any of the following work? Pills for yeast infection   Have you had a temperature of 100.4 or higher? I don't know   Provide any information you feel is important to your history not asked above    Please attach any relevant images or  files    Are you able to take your vitals? No         Encounter Diagnosis   Name Primary?    Acute vaginitis Yes        No orders of the defined types were placed in this encounter.     Medications Ordered This Encounter   Medications    fluconazole (DIFLUCAN) 150 MG Tab     Sig: Take 1 tablet (150 mg total) by mouth once. REFILL AND RE-DOSE IF SYMPTOMS RECUR for 1 dose     Dispense:  1 tablet     Refill:  1        No follow-ups on file.

## 2025-07-30 ENCOUNTER — HOSPITAL ENCOUNTER (OUTPATIENT)
Dept: RADIOLOGY | Facility: HOSPITAL | Age: 45
Discharge: HOME | End: 2025-07-30
Attending: NURSE PRACTITIONER
Payer: OTHER GOVERNMENT

## 2025-07-30 DIAGNOSIS — Z12.31 ENCOUNTER FOR SCREENING MAMMOGRAM FOR BREAST CANCER: ICD-10-CM

## 2025-07-30 PROCEDURE — 77067 SCR MAMMO BI INCL CAD: CPT | Mod: TC,PO

## 2025-07-30 PROCEDURE — 77063 BREAST TOMOSYNTHESIS BI: CPT | Mod: 26,,, | Performed by: RADIOLOGY

## 2025-07-30 PROCEDURE — 77067 SCR MAMMO BI INCL CAD: CPT | Mod: 26,,, | Performed by: RADIOLOGY
